# Patient Record
Sex: FEMALE | Race: WHITE | Employment: OTHER | URBAN - METROPOLITAN AREA
[De-identification: names, ages, dates, MRNs, and addresses within clinical notes are randomized per-mention and may not be internally consistent; named-entity substitution may affect disease eponyms.]

---

## 2022-01-04 ENCOUNTER — APPOINTMENT (OUTPATIENT)
Dept: GENERAL RADIOLOGY | Age: 67
DRG: 190 | End: 2022-01-04
Payer: COMMERCIAL

## 2022-01-04 ENCOUNTER — HOSPITAL ENCOUNTER (INPATIENT)
Age: 67
LOS: 4 days | Discharge: PSYCHIATRIC OR SUBSTANCE USE FACILITY | DRG: 190 | End: 2022-01-09
Attending: STUDENT IN AN ORGANIZED HEALTH CARE EDUCATION/TRAINING PROGRAM | Admitting: INTERNAL MEDICINE
Payer: COMMERCIAL

## 2022-01-04 DIAGNOSIS — J18.9 HCAP (HEALTHCARE-ASSOCIATED PNEUMONIA): Primary | ICD-10-CM

## 2022-01-04 LAB
ANION GAP SERPL CALCULATED.3IONS-SCNC: 10 MMOL/L (ref 3–16)
BASE EXCESS VENOUS: 10.2 MMOL/L (ref -2–3)
BILIRUBIN URINE: NEGATIVE
BLOOD, URINE: NEGATIVE
BUN BLDV-MCNC: 19 MG/DL (ref 7–20)
CALCIUM SERPL-MCNC: 9.2 MG/DL (ref 8.3–10.6)
CARBOXYHEMOGLOBIN: 0.9 % (ref 0–1.5)
CHLORIDE BLD-SCNC: 99 MMOL/L (ref 99–110)
CLARITY: CLEAR
CO2: 34 MMOL/L (ref 21–32)
COLOR: YELLOW
CREAT SERPL-MCNC: <0.5 MG/DL (ref 0.6–1.2)
GFR AFRICAN AMERICAN: >60
GFR NON-AFRICAN AMERICAN: >60
GLUCOSE BLD-MCNC: 102 MG/DL (ref 70–99)
GLUCOSE URINE: NEGATIVE MG/DL
HCO3 VENOUS: 38.7 MMOL/L (ref 24–28)
HEMOGLOBIN, VEN, REDUCED: 14.9 %
KETONES, URINE: NEGATIVE MG/DL
LACTIC ACID: 1.4 MMOL/L (ref 0.4–2)
LEUKOCYTE ESTERASE, URINE: NEGATIVE
METHEMOGLOBIN VENOUS: 0.5 % (ref 0–1.5)
MICROSCOPIC EXAMINATION: NORMAL
NITRITE, URINE: NEGATIVE
O2 SAT, VEN: 85 %
PCO2, VEN: 67.2 MMHG (ref 41–51)
PH UA: 7 (ref 5–8)
PH VENOUS: 7.37 (ref 7.35–7.45)
PO2, VEN: 52.6 MMHG (ref 25–40)
POTASSIUM REFLEX MAGNESIUM: 4.1 MMOL/L (ref 3.5–5.1)
PRO-BNP: 132 PG/ML (ref 0–124)
PROTEIN UA: NEGATIVE MG/DL
SODIUM BLD-SCNC: 143 MMOL/L (ref 136–145)
SPECIFIC GRAVITY UA: 1.02 (ref 1–1.03)
TCO2 CALC VENOUS: 41 MMOL/L
TROPONIN: <0.01 NG/ML
URINE REFLEX TO CULTURE: NORMAL
URINE TYPE: NORMAL
UROBILINOGEN, URINE: 0.2 E.U./DL

## 2022-01-04 PROCEDURE — 84145 PROCALCITONIN (PCT): CPT

## 2022-01-04 PROCEDURE — 71045 X-RAY EXAM CHEST 1 VIEW: CPT

## 2022-01-04 PROCEDURE — 84484 ASSAY OF TROPONIN QUANT: CPT

## 2022-01-04 PROCEDURE — 83605 ASSAY OF LACTIC ACID: CPT

## 2022-01-04 PROCEDURE — 82803 BLOOD GASES ANY COMBINATION: CPT

## 2022-01-04 PROCEDURE — 83880 ASSAY OF NATRIURETIC PEPTIDE: CPT

## 2022-01-04 PROCEDURE — 99285 EMERGENCY DEPT VISIT HI MDM: CPT

## 2022-01-04 PROCEDURE — 80048 BASIC METABOLIC PNL TOTAL CA: CPT

## 2022-01-04 PROCEDURE — 87804 INFLUENZA ASSAY W/OPTIC: CPT

## 2022-01-04 PROCEDURE — U0005 INFEC AGEN DETEC AMPLI PROBE: HCPCS

## 2022-01-04 PROCEDURE — 36415 COLL VENOUS BLD VENIPUNCTURE: CPT

## 2022-01-04 PROCEDURE — U0003 INFECTIOUS AGENT DETECTION BY NUCLEIC ACID (DNA OR RNA); SEVERE ACUTE RESPIRATORY SYNDROME CORONAVIRUS 2 (SARS-COV-2) (CORONAVIRUS DISEASE [COVID-19]), AMPLIFIED PROBE TECHNIQUE, MAKING USE OF HIGH THROUGHPUT TECHNOLOGIES AS DESCRIBED BY CMS-2020-01-R: HCPCS

## 2022-01-04 PROCEDURE — 85025 COMPLETE CBC W/AUTO DIFF WBC: CPT

## 2022-01-04 PROCEDURE — 81003 URINALYSIS AUTO W/O SCOPE: CPT

## 2022-01-04 PROCEDURE — 87040 BLOOD CULTURE FOR BACTERIA: CPT

## 2022-01-04 PROCEDURE — 93005 ELECTROCARDIOGRAM TRACING: CPT | Performed by: INTERNAL MEDICINE

## 2022-01-05 ENCOUNTER — APPOINTMENT (OUTPATIENT)
Dept: CT IMAGING | Age: 67
DRG: 190 | End: 2022-01-05
Payer: COMMERCIAL
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
## 2022-01-05 PROBLEM — J18.9 HAP (HOSPITAL-ACQUIRED PNEUMONIA): Status: ACTIVE | Noted: 2022-01-05

## 2022-01-05 LAB
ATYPICAL LYMPHOCYTE RELATIVE PERCENT: 4 % (ref 0–6)
BANDED NEUTROPHILS RELATIVE PERCENT: 1 % (ref 0–7)
BASOPHILS ABSOLUTE: 0 K/UL (ref 0–0.2)
BASOPHILS RELATIVE PERCENT: 0 %
BLASTS RELATIVE PERCENT: 1 %
EKG ATRIAL RATE: 108 BPM
EKG DIAGNOSIS: NORMAL
EKG P AXIS: 49 DEGREES
EKG P-R INTERVAL: 92 MS
EKG Q-T INTERVAL: 334 MS
EKG QRS DURATION: 68 MS
EKG QTC CALCULATION (BAZETT): 447 MS
EKG R AXIS: 108 DEGREES
EKG T AXIS: 56 DEGREES
EKG VENTRICULAR RATE: 108 BPM
EOSINOPHILS ABSOLUTE: 0.5 K/UL (ref 0–0.6)
EOSINOPHILS RELATIVE PERCENT: 4 %
HCT VFR BLD CALC: 47.7 % (ref 36–48)
HEMATOLOGY PATH CONSULT: NORMAL
HEMATOLOGY PATH CONSULT: YES
HEMOGLOBIN: 15.7 G/DL (ref 12–16)
LYMPHOCYTES ABSOLUTE: 1.2 K/UL (ref 1–5.1)
LYMPHOCYTES RELATIVE PERCENT: 6 %
MCH RBC QN AUTO: 32.7 PG (ref 26–34)
MCHC RBC AUTO-ENTMCNC: 32.9 G/DL (ref 31–36)
MCV RBC AUTO: 99.4 FL (ref 80–100)
MONOCYTES ABSOLUTE: 1.7 K/UL (ref 0–1.3)
MONOCYTES RELATIVE PERCENT: 15 %
NEUTROPHILS ABSOLUTE: 8 K/UL (ref 1.7–7.7)
NEUTROPHILS RELATIVE PERCENT: 68 %
PDW BLD-RTO: 13.4 % (ref 12.4–15.4)
PLASMA CELLS PERCENT: 1 %
PLATELET # BLD: 196 K/UL (ref 135–450)
PLATELET SLIDE REVIEW: ADEQUATE
PMV BLD AUTO: 9.4 FL (ref 5–10.5)
PROCALCITONIN: 0.04 NG/ML (ref 0–0.15)
RAPID INFLUENZA  B AGN: NEGATIVE
RAPID INFLUENZA A AGN: NEGATIVE
RBC # BLD: 4.8 M/UL (ref 4–5.2)
RBC # BLD: NORMAL 10*6/UL
SARS-COV-2: NOT DETECTED
WBC # BLD: 11.6 K/UL (ref 4–11)

## 2022-01-05 PROCEDURE — 2580000003 HC RX 258: Performed by: EMERGENCY MEDICINE

## 2022-01-05 PROCEDURE — 94640 AIRWAY INHALATION TREATMENT: CPT

## 2022-01-05 PROCEDURE — 6360000004 HC RX CONTRAST MEDICATION: Performed by: EMERGENCY MEDICINE

## 2022-01-05 PROCEDURE — 2580000003 HC RX 258: Performed by: INTERNAL MEDICINE

## 2022-01-05 PROCEDURE — 6370000000 HC RX 637 (ALT 250 FOR IP): Performed by: INTERNAL MEDICINE

## 2022-01-05 PROCEDURE — 2060000000 HC ICU INTERMEDIATE R&B

## 2022-01-05 PROCEDURE — 6360000002 HC RX W HCPCS: Performed by: INTERNAL MEDICINE

## 2022-01-05 PROCEDURE — 96372 THER/PROPH/DIAG INJ SC/IM: CPT

## 2022-01-05 PROCEDURE — 6360000002 HC RX W HCPCS: Performed by: EMERGENCY MEDICINE

## 2022-01-05 PROCEDURE — 71260 CT THORAX DX C+: CPT

## 2022-01-05 PROCEDURE — 2700000000 HC OXYGEN THERAPY PER DAY

## 2022-01-05 PROCEDURE — 94761 N-INVAS EAR/PLS OXIMETRY MLT: CPT

## 2022-01-05 PROCEDURE — 87040 BLOOD CULTURE FOR BACTERIA: CPT

## 2022-01-05 RX ORDER — DIVALPROEX SODIUM 250 MG/1
250 TABLET, DELAYED RELEASE ORAL 2 TIMES DAILY
Status: DISCONTINUED | OUTPATIENT
Start: 2022-01-05 | End: 2022-01-09 | Stop reason: HOSPADM

## 2022-01-05 RX ORDER — MAGNESIUM HYDROXIDE/ALUMINUM HYDROXICE/SIMETHICONE 120; 1200; 1200 MG/30ML; MG/30ML; MG/30ML
30 SUSPENSION ORAL EVERY 4 HOURS PRN
COMMUNITY

## 2022-01-05 RX ORDER — NICOTINE 21 MG/24HR
1 PATCH, TRANSDERMAL 24 HOURS TRANSDERMAL EVERY 24 HOURS
COMMUNITY

## 2022-01-05 RX ORDER — DIVALPROEX SODIUM 250 MG/1
250 TABLET, DELAYED RELEASE ORAL 2 TIMES DAILY
COMMUNITY

## 2022-01-05 RX ORDER — SODIUM CHLORIDE 0.9 % (FLUSH) 0.9 %
5-40 SYRINGE (ML) INJECTION EVERY 12 HOURS SCHEDULED
Status: DISCONTINUED | OUTPATIENT
Start: 2022-01-05 | End: 2022-01-09 | Stop reason: HOSPADM

## 2022-01-05 RX ORDER — ATORVASTATIN CALCIUM 20 MG/1
20 TABLET, FILM COATED ORAL NIGHTLY
Status: DISCONTINUED | OUTPATIENT
Start: 2022-01-05 | End: 2022-01-09 | Stop reason: HOSPADM

## 2022-01-05 RX ORDER — DONEPEZIL HYDROCHLORIDE 5 MG/1
5 TABLET, FILM COATED ORAL NIGHTLY
COMMUNITY

## 2022-01-05 RX ORDER — MONTELUKAST SODIUM 10 MG/1
10 TABLET ORAL NIGHTLY
Status: DISCONTINUED | OUTPATIENT
Start: 2022-01-05 | End: 2022-01-09 | Stop reason: HOSPADM

## 2022-01-05 RX ORDER — LEVETIRACETAM 750 MG/1
1500 TABLET ORAL 2 TIMES DAILY
Status: DISCONTINUED | OUTPATIENT
Start: 2022-01-05 | End: 2022-01-09 | Stop reason: HOSPADM

## 2022-01-05 RX ORDER — LEVETIRACETAM 500 MG/1
1500 TABLET ORAL 3 TIMES DAILY
COMMUNITY
End: 2022-01-05 | Stop reason: ALTCHOICE

## 2022-01-05 RX ORDER — HALOPERIDOL 5 MG/ML
5 INJECTION INTRAMUSCULAR ONCE
Status: COMPLETED | OUTPATIENT
Start: 2022-01-05 | End: 2022-01-05

## 2022-01-05 RX ORDER — MONTELUKAST SODIUM 10 MG/1
10 TABLET ORAL NIGHTLY
COMMUNITY

## 2022-01-05 RX ORDER — ACETAMINOPHEN 325 MG/1
650 TABLET ORAL EVERY 6 HOURS PRN
Status: DISCONTINUED | OUTPATIENT
Start: 2022-01-05 | End: 2022-01-09 | Stop reason: HOSPADM

## 2022-01-05 RX ORDER — IPRATROPIUM BROMIDE AND ALBUTEROL SULFATE 2.5; .5 MG/3ML; MG/3ML
1 SOLUTION RESPIRATORY (INHALATION) EVERY 6 HOURS PRN
COMMUNITY

## 2022-01-05 RX ORDER — LEVOFLOXACIN 5 MG/ML
500 INJECTION, SOLUTION INTRAVENOUS EVERY 24 HOURS
Status: DISCONTINUED | OUTPATIENT
Start: 2022-01-05 | End: 2022-01-09 | Stop reason: HOSPADM

## 2022-01-05 RX ORDER — LORAZEPAM 1 MG/1
1 TABLET ORAL 2 TIMES DAILY PRN
Status: DISCONTINUED | OUTPATIENT
Start: 2022-01-05 | End: 2022-01-06

## 2022-01-05 RX ORDER — ALBUTEROL SULFATE 90 UG/1
2 AEROSOL, METERED RESPIRATORY (INHALATION) EVERY 6 HOURS PRN
COMMUNITY

## 2022-01-05 RX ORDER — SUCRALFATE 1 G/1
1 TABLET ORAL 4 TIMES DAILY
COMMUNITY

## 2022-01-05 RX ORDER — ACETAMINOPHEN 325 MG/1
650 TABLET ORAL EVERY 6 HOURS PRN
COMMUNITY

## 2022-01-05 RX ORDER — LEVETIRACETAM 500 MG/1
1500 TABLET ORAL 2 TIMES DAILY
COMMUNITY

## 2022-01-05 RX ORDER — ONDANSETRON 4 MG/1
4 TABLET, ORALLY DISINTEGRATING ORAL EVERY 8 HOURS PRN
Status: DISCONTINUED | OUTPATIENT
Start: 2022-01-05 | End: 2022-01-09 | Stop reason: HOSPADM

## 2022-01-05 RX ORDER — DONEPEZIL HYDROCHLORIDE 5 MG/1
5 TABLET, FILM COATED ORAL NIGHTLY
Status: DISCONTINUED | OUTPATIENT
Start: 2022-01-05 | End: 2022-01-07

## 2022-01-05 RX ORDER — SODIUM CHLORIDE 9 MG/ML
25 INJECTION, SOLUTION INTRAVENOUS PRN
Status: DISCONTINUED | OUTPATIENT
Start: 2022-01-05 | End: 2022-01-09 | Stop reason: HOSPADM

## 2022-01-05 RX ORDER — TRAZODONE HYDROCHLORIDE 50 MG/1
25 TABLET ORAL NIGHTLY
COMMUNITY
End: 2022-01-05 | Stop reason: ALTCHOICE

## 2022-01-05 RX ORDER — MORPHINE SULFATE 2 MG/ML
2 INJECTION, SOLUTION INTRAMUSCULAR; INTRAVENOUS ONCE
Status: COMPLETED | OUTPATIENT
Start: 2022-01-05 | End: 2022-01-05

## 2022-01-05 RX ORDER — LEVETIRACETAM 750 MG/1
1500 TABLET ORAL 3 TIMES DAILY
Status: DISCONTINUED | OUTPATIENT
Start: 2022-01-05 | End: 2022-01-05 | Stop reason: CLARIF

## 2022-01-05 RX ORDER — ONDANSETRON 2 MG/ML
4 INJECTION INTRAMUSCULAR; INTRAVENOUS EVERY 6 HOURS PRN
Status: DISCONTINUED | OUTPATIENT
Start: 2022-01-05 | End: 2022-01-09 | Stop reason: HOSPADM

## 2022-01-05 RX ORDER — ATORVASTATIN CALCIUM 20 MG/1
20 TABLET, FILM COATED ORAL NIGHTLY
COMMUNITY

## 2022-01-05 RX ORDER — POLYETHYLENE GLYCOL 3350 17 G/17G
17 POWDER, FOR SOLUTION ORAL DAILY PRN
Status: DISCONTINUED | OUTPATIENT
Start: 2022-01-05 | End: 2022-01-09 | Stop reason: HOSPADM

## 2022-01-05 RX ORDER — OMEPRAZOLE 10 MG/1
10 CAPSULE, DELAYED RELEASE ORAL DAILY
COMMUNITY
End: 2022-01-05 | Stop reason: ALTCHOICE

## 2022-01-05 RX ORDER — ACETAMINOPHEN 650 MG/1
650 SUPPOSITORY RECTAL EVERY 6 HOURS PRN
Status: DISCONTINUED | OUTPATIENT
Start: 2022-01-05 | End: 2022-01-09 | Stop reason: HOSPADM

## 2022-01-05 RX ORDER — OMEPRAZOLE 40 MG/1
40 CAPSULE, DELAYED RELEASE ORAL DAILY
COMMUNITY

## 2022-01-05 RX ORDER — SODIUM CHLORIDE 0.9 % (FLUSH) 0.9 %
5-40 SYRINGE (ML) INJECTION PRN
Status: DISCONTINUED | OUTPATIENT
Start: 2022-01-05 | End: 2022-01-09 | Stop reason: HOSPADM

## 2022-01-05 RX ADMIN — CEFEPIME HYDROCHLORIDE 2000 MG: 2 INJECTION, POWDER, FOR SOLUTION INTRAVENOUS at 08:49

## 2022-01-05 RX ADMIN — LEVETIRACETAM 1500 MG: 750 TABLET ORAL at 09:34

## 2022-01-05 RX ADMIN — LEVOFLOXACIN 500 MG: 5 INJECTION, SOLUTION INTRAVENOUS at 16:03

## 2022-01-05 RX ADMIN — CEFEPIME HYDROCHLORIDE 1000 MG: 1 INJECTION, POWDER, FOR SOLUTION INTRAMUSCULAR; INTRAVENOUS at 03:37

## 2022-01-05 RX ADMIN — IOPAMIDOL 80 ML: 755 INJECTION, SOLUTION INTRAVENOUS at 00:46

## 2022-01-05 RX ADMIN — SODIUM CHLORIDE 25 ML: 9 INJECTION, SOLUTION INTRAVENOUS at 16:02

## 2022-01-05 RX ADMIN — SODIUM CHLORIDE 25 ML: 9 INJECTION, SOLUTION INTRAVENOUS at 08:48

## 2022-01-05 RX ADMIN — VANCOMYCIN HYDROCHLORIDE 1250 MG: 10 INJECTION, POWDER, LYOPHILIZED, FOR SOLUTION INTRAVENOUS at 04:37

## 2022-01-05 RX ADMIN — LEVETIRACETAM 1500 MG: 750 TABLET, FILM COATED ORAL at 21:02

## 2022-01-05 RX ADMIN — IPRATROPIUM BROMIDE AND ALBUTEROL 1 PUFF: 20; 100 SPRAY, METERED RESPIRATORY (INHALATION) at 09:56

## 2022-01-05 RX ADMIN — HALOPERIDOL LACTATE 5 MG: 5 INJECTION, SOLUTION INTRAMUSCULAR at 00:23

## 2022-01-05 RX ADMIN — ATORVASTATIN CALCIUM 20 MG: 20 TABLET, FILM COATED ORAL at 21:02

## 2022-01-05 RX ADMIN — IPRATROPIUM BROMIDE AND ALBUTEROL 1 PUFF: 20; 100 SPRAY, METERED RESPIRATORY (INHALATION) at 16:00

## 2022-01-05 RX ADMIN — DIVALPROEX SODIUM 250 MG: 250 TABLET, DELAYED RELEASE ORAL at 09:33

## 2022-01-05 RX ADMIN — IPRATROPIUM BROMIDE AND ALBUTEROL 1 PUFF: 20; 100 SPRAY, METERED RESPIRATORY (INHALATION) at 12:00

## 2022-01-05 RX ADMIN — MORPHINE SULFATE 2 MG: 2 INJECTION, SOLUTION INTRAMUSCULAR; INTRAVENOUS at 14:18

## 2022-01-05 RX ADMIN — SODIUM CHLORIDE, PRESERVATIVE FREE 10 ML: 5 INJECTION INTRAVENOUS at 21:04

## 2022-01-05 RX ADMIN — DONEPEZIL HYDROCHLORIDE 5 MG: 5 TABLET, FILM COATED ORAL at 21:02

## 2022-01-05 RX ADMIN — LORAZEPAM 1 MG: 1 TABLET ORAL at 18:27

## 2022-01-05 RX ADMIN — MONTELUKAST 10 MG: 10 TABLET, FILM COATED ORAL at 21:02

## 2022-01-05 RX ADMIN — SODIUM CHLORIDE, PRESERVATIVE FREE 10 ML: 5 INJECTION INTRAVENOUS at 16:03

## 2022-01-05 RX ADMIN — DIVALPROEX SODIUM 250 MG: 250 TABLET, DELAYED RELEASE ORAL at 21:02

## 2022-01-05 RX ADMIN — ENOXAPARIN SODIUM 40 MG: 100 INJECTION SUBCUTANEOUS at 11:02

## 2022-01-05 ASSESSMENT — PAIN SCALES - GENERAL
PAINLEVEL_OUTOF10: 0
PAINLEVEL_OUTOF10: 7
PAINLEVEL_OUTOF10: 0
PAINLEVEL_OUTOF10: 10

## 2022-01-05 ASSESSMENT — ENCOUNTER SYMPTOMS
EYES NEGATIVE: 1
ABDOMINAL PAIN: 0
VOMITING: 1
SHORTNESS OF BREATH: 1
NAUSEA: 1
WHEEZING: 1
ALLERGIC/IMMUNOLOGIC NEGATIVE: 1
COUGH: 1
BACK PAIN: 1

## 2022-01-05 ASSESSMENT — PAIN - FUNCTIONAL ASSESSMENT: PAIN_FUNCTIONAL_ASSESSMENT: PREVENTS OR INTERFERES SOME ACTIVE ACTIVITIES AND ADLS

## 2022-01-05 ASSESSMENT — PAIN DESCRIPTION - LOCATION: LOCATION: BACK;HEAD

## 2022-01-05 ASSESSMENT — PAIN DESCRIPTION - DESCRIPTORS: DESCRIPTORS: ACHING;DISCOMFORT;HEADACHE

## 2022-01-05 ASSESSMENT — PAIN DESCRIPTION - PROGRESSION: CLINICAL_PROGRESSION: NOT CHANGED

## 2022-01-05 ASSESSMENT — PAIN DESCRIPTION - ONSET: ONSET: ON-GOING

## 2022-01-05 ASSESSMENT — PAIN DESCRIPTION - FREQUENCY: FREQUENCY: CONTINUOUS

## 2022-01-05 ASSESSMENT — PAIN DESCRIPTION - ORIENTATION: ORIENTATION: LOWER

## 2022-01-05 ASSESSMENT — PAIN DESCRIPTION - PAIN TYPE: TYPE: ACUTE PAIN

## 2022-01-05 NOTE — CONSULTS
Clinical Pharmacy Progress Note    Vancomycin - Management by Pharmacy    Consult Date(s): 1/5  Consulting Provider(s): Dr Pam Oneil / Plan    HAP - Vancomycin   Concurrent Antimicrobials: Cefepime  o Cefepime -- day #1 -- will adjust dose per policy to 8128FC IV F2K based on indication / renal function.  Day of Vanc Therapy: #1   Current Dosing Method: Bayesian-Guided AUC Dosing   Therapeutic Goal: 400-600 mg/L*hr   Current Dose / Frequency: 750mg IV q12h   Plan / Rationale:   o Patient loaded with 1250mg IV x1 in the ED.   o Scr today < 0.5; likely at baseline. o Regimen of 750mg IV q12h predicts an AUC of 438 and trough 12.9 mcg/mL. o Will plan for a trough level 1/6 @ 0330 to assess kinetics. o MRSA nasal PCR ordered.  Will continue to monitor clinical condition and make adjustments to regimen as appropriate. Thank you for consulting Pharmacy! Flynn Burkitt PharmD., BCPS   1/5/2022 8:21 AM  Wireless: [phone]      Subjective/Objective: Ms. Elder Abebe is a 74 y.o. female from [facility] with a PMHx significant for HTN, COPD on chronic O2, seizure dx, vascular dementia, major depressive dx, and chronic sinusitis. [facility] despite new finding of [address] after a fall. Presented back to [facility] on 12/27 after being \"lost for 2 days,\" then admitted 12/29-1/1 with active psychosis, and discharged to [facility]. Presents now from [facility] with subjective fever, chills, and SOB. CTPA in the ED negative for PE, but shows evidence of PNA. Pharmacy has been consulted to dose vancomycin.     Height:   Ht Readings from Last 1 Encounters:   01/04/22 5' 4\" (1.626 m)     Weight:   Wt Readings from Last 1 Encounters:   01/04/22 110 lb 14.4 oz (50.3 kg)       Current & Prior Antimicrobial Regimen(s):   Cefepime 2000mg IV q8h (1/5-current)   Vancomycin -- pharmacy to dose   o 1250mg IV x1 (12/5)  o 750mg IV q12h (12/5-current)    Level(s) / Doses:    Date Time Dose Level / Type of Level Interpretation   1/6 0330 750mg IV q12h Trough = ordered           Note: Serum levels collected for AUC-based dosing may be high if collected in close proximity to the dose administered. This is not necessarily an indicator of toxicity. Cultures & Sensitivities:    Date Site Micro Susceptibility / Result   1/4 COVID In process    1/4 Rapid flu Negative    1/5 Blood Collected    1/5 MSRA nasal PCR Ordered      Labs / Ancillary Data:    CrCl cannot be calculated (This lab value cannot be used to calculate CrCl because it is not a number: <0.5).     Recent Labs     01/04/22  2210   CREATININE <0.5*   BUN 19   WBC 11.6*       Additional Lab Values / Findings of Note:    Procalcitonin:   Recent Labs     01/04/22  2210   PROCAL 0.04

## 2022-01-05 NOTE — ED PROVIDER NOTES
[address]          ATTENDING PHYSICIAN NOTE       Date of evaluation: 1/4/2022    Chief Complaint     Other (oxygen tubing got caught on chair, on 4 liters nc continously- ( normally on 2 liters NC but increased yesterday)states couldn't get untangled and started feeling ill, feels back to normal now with oxygen working ( sent in by [facility])), Shortness of Breath (patient states improved), and Cough      History of Present Illness     Le Patel is a 74 y.o. female who presents with hypoxia. The patient has COPD and is on 2 L home O2. She states that the oxygen tubing got tangled causing her to feel short of breath but that she now feels better. Her facility states that she has had subjective fever and chills for the last couple of days, lethargy, and they had to increase her oxygen to 4 L. The patient has a cough while I am taking a history and states that has been there for couple of days, occasionally productive of phlegm. She reports feeling short of breath and having chest pain although cannot state whether this is worse than usual with her COPD. Does admit to having fevers and chills. Does admit to feeling generally weak for the last couple of days and tiring out more easily. No vomiting or diarrhea. No abdominal pain. No other aggravating relieving factors or associated symptoms. Patient reportedly had a rapid Covid test today that was negative, unclear whether this was antigen or PCR. Review of Systems     Positive for subjective fever, shortness of breath, chest pain. Negative for vomiting and abdominal pain. All other systems were reviewed and are negative, except as mentioned in HPI. Past Medical, Surgical, Family, and Social History     She has no past medical history on file. She has no past surgical history on file. Her family history is not on file.   She     Medications     Previous Medications    No medications on file       Allergies She has no allergies on file. Physical Exam     INITIAL VITALS: BP: 128/81, Temp: 98.6 °F (37 °C), Pulse: 115, Resp: 18, SpO2: 95 %       General:  No acute distress. Eyes:  Pupils reactive. No discharge from eyes. ENT:  No discharge from nose. Neck:  Supple. Pulmonary:   Non-labored breathing but mildly tachypneic. Breath sounds clear bilaterally. Cardiac: Tachycardic rate and regular rhythm. No murmurs. Abdomen:  Soft. Non-tender. Non-distended. Musculoskeletal:  No CVA tenderness. Skin:  No rash. Neuro: Awake and alert. Moves all four extremities to command. No focal deficit. Extremities:  Warm and perfused. No peripheral edema. Diagnostic Results     LABS:   Please see electronic medical record for laboratory tests performed in the ED. RADIOLOGY:  Please see electronic medical record for imaging studies performed in the ED. EKG   Please see medical record for specific interval measurements. Impression: Sinus tachycardia at 108 bpm, short IL interval, normal axis, T wave inversion in V3, no other acute ST changes, no old for comparison    RECENT VITALS:  BP: 128/81, Temp: 98.6 °F (37 °C), Pulse: 115, Resp: 18     Procedures         ED Course     Nursing Notes, Past Medical Hx, Past Surgical Hx, Social Hx, Allergies, and Family Hx were reviewed. The patient was given the following medications:  No orders of the defined types were placed in this encounter. CONSULTS:  None    MEDICAL DECISION MAKING / ASSESSMENT / PLAN     Valiant Charlie is a 74 y.o. female presenting with hypoxia. The patient does have an increased O2 requirement from 2 to 4 L. Is tachypneic. Describes infectious symptoms and has a notable cough. EKG unremarkable. Laboratory work-up, chest x-ray, influenza and Covid are pending. The patient will be signed out to the oncoming physician to complete work-up and disposition.                 Chelsie Noonan MD  01/04/22 0512

## 2022-01-05 NOTE — H&P
Hospital Medicine History & Physical      PCP: No primary care provider on file. Date of Admission: 1/4/2022    Date of Service: Pt seen/examined on 1/5/2022 and Admitted to Inpatient with expected LOS greater than two midnights due to medical therapy. Chief Complaint:    SOB  Feeling unwell    History Of Present Illness: This is a 75 yo female presenting w/ feeling unwell w/ sx of nausea, vomiting, myalgias and malaise x 1 week. She reports her vomiting finally stopped when she stopped eating- last meal she reports was 2 days ago which was a popsicle and soup. She Also reports a h/o copd and chronic o2 yuse but felt more sob that usual and has had a nonproductive cough. Past Medical History:    COPD  GERD  Depression    Past Surgical History:    Surgery Date Site/Laterality Comments   TONSILLECTOMY          TUBAL LIGATION          UPPER GASTROINTESTINAL ENDOSCOPY        Medications Prior to Admission:      Prior to Admission medications    Medication Sig Start Date End Date Taking? Authorizing Provider   albuterol sulfate HFA (VENTOLIN HFA) 108 (90 Base) MCG/ACT inhaler Inhale 2 puffs into the lungs every 6 hours as needed for Wheezing   Yes Historical Provider, MD   traZODone (DESYREL) 50 MG tablet Take 25 mg by mouth nightly Half of 50 mg tab   Yes Historical Provider, MD   magnesium hydroxide (MILK OF MAGNESIA) 400 MG/5ML suspension Take 30 mLs by mouth daily as needed for Constipation   Yes Historical Provider, MD   acetaminophen (TYLENOL) 325 MG tablet Take 650 mg by mouth every 6 hours as needed for Pain   Yes Historical Provider, MD   divalproex (DEPAKOTE) 250 MG DR tablet Take 250 mg by mouth 2 times daily ?  DR   Yes Historical Provider, MD   omeprazole (PRILOSEC) 10 MG delayed release capsule Take 10 mg by mouth daily   Yes Historical Provider, MD   nicotine (NICODERM CQ) 21 MG/24HR Place 1 patch onto the skin every 24 hours   Yes Historical Provider, MD   levETIRAcetam (KEPPRA) 500 MG tablet Take 1,500 mg by mouth three times daily   Yes Historical Provider, MD   sucralfate (CARAFATE) 1 GM tablet Take 1 g by mouth 4 times daily   Yes Historical Provider, MD   atorvastatin (LIPITOR) 20 MG tablet Take 20 mg by mouth nightly   Yes Historical Provider, MD   ipratropium-albuterol (DUONEB) 0.5-2.5 (3) MG/3ML SOLN nebulizer solution Inhale 1 vial into the lungs every 6 hours as needed for Shortness of Breath   Yes Historical Provider, MD   montelukast (SINGULAIR) 10 MG tablet Take 10 mg by mouth nightly   Yes Historical Provider, MD       Allergies:  Pcn [penicillins] and Sulfa antibiotics    Social History:      TOBACCO:   has no history on file for tobacco use. ETOH:   has no history on file for alcohol use. E-Cigarettes/Vaping Use     Questions Responses    E-Cigarette/Vaping Use     Start Date     Passive Exposure     Quit Date     Counseling Given     Comments         Family History:    Heart disease Brother        Cancer Mother    breast   Hypertension Mother        Seizures Paternal Uncle        Drug abuse Son 1       REVIEW OF SYSTEMS COMPLETED:    Review of Systems   Constitutional: Positive for activity change, appetite change, chills, fatigue and fever. HENT: Negative. Eyes: Negative. Respiratory: Positive for cough, shortness of breath and wheezing. Cardiovascular: Negative for chest pain, palpitations and leg swelling. Gastrointestinal: Positive for nausea and vomiting. Negative for abdominal pain. Endocrine: Negative. Genitourinary: Negative. Musculoskeletal: Positive for back pain and myalgias. Allergic/Immunologic: Negative. Neurological: Negative. Hematological: Negative. Psychiatric/Behavioral: The patient is nervous/anxious.       PHYSICAL EXAM PERFORMED:    /72   Pulse 92   Temp 98.6 °F (37 °C) (Axillary)   Resp (!) 0   Ht 5' 4\" (1.626 m)   Wt 110 lb 14.4 oz (50.3 kg)   SpO2 94%   Breastfeeding No   BMI 19.04 kg/m²     General appearance: appears ill  HEENT:    Extra ocular muscles intact. Conjunctivae/corneas clear. Neck: Supple, with full range of motion. Respiratory:  Normal respiratory effort. Course bs b/l  Cardiovascular:  Tachy  Abdomen: Soft, non-tender, non-distended with normal bowel sounds. Musculoskeletal:  No clubbing, cyanosis or edema bilaterally. Skin: Skin color, texture, turgor normal.  No rashes or lesions. Neurologic:  Neurovascularly intact without any focal sensory/motor deficits. Cranial nerves: II-XII intact, grossly non-focal.  Psychiatric:  Alert and oriented, thought content appropriate, normal insight    Labs:     Recent Labs     01/04/22 2210   WBC 11.6*   HGB 15.7   HCT 47.7        Recent Labs     01/04/22 2210      K 4.1   CL 99   CO2 34*   BUN 19   CREATININE <0.5*   CALCIUM 9.2     No results for input(s): AST, ALT, BILIDIR, BILITOT, ALKPHOS in the last 72 hours. No results for input(s): INR in the last 72 hours. Recent Labs     01/04/22 2210   TROPONINI <0.01       Urinalysis:      Lab Results   Component Value Date    NITRU Negative 01/04/2022    BLOODU Negative 01/04/2022    SPECGRAV 1.020 01/04/2022    GLUCOSEU Negative 01/04/2022       Radiology:     CT CHEST PULMONARY EMBOLISM W CONTRAST   Final Result      1. No evidence of pulmonary embolus. 2. Moderate left basilar airspace consolidation consistent with pneumonia. 3. Severe emphysema. XR CHEST PORTABLE   Final Result   1. Emphysema. 2. No acute airspace disease. ASSESSMENT/ PLAN:    Acute COPD exacerbation:  Likely 2/2 acute tracheobronchitis vs viral.  Covid pending. Started on cefepime and vanc as comes from a facility but can likely de-escalate as cxr negative. Will place on levaquin awaiting covid. If covid negative will obtain viral panel to exclude rhinovirus/ rsv etc.  Started on decadron- cont    Seizure d/o:  Cont keppra. Anxiety:  Add prn ativan.     GERD:  Place on ppi and

## 2022-01-05 NOTE — PROGRESS NOTES
Vancomcyin has been discontinued. Pharmacy will sign off consult. If medication dosing is resumed, please re-consult pharmacy.     Kat LearyD, [facility] 1/5/2022 3:43 PM

## 2022-01-05 NOTE — ED PROVIDER NOTES
[address] ENCOUNTER          ATTENDING PHYSICIAN NOTE       Date of evaluation: 1/4/2022    ADDENDUM:      Care of this patient was assumed from Dr. Clifton Rodríguez. The patient was seen for Other (oxygen tubing got caught on chair, on 4 liters nc continously- ( normally on 2 liters NC but increased yesterday)states couldn't get untangled and started feeling ill, feels back to normal now with oxygen working ( sent in by [facility])), Shortness of Breath (patient states improved), and Cough  . The patient's initial evaluation and plan have been discussed with the prior provider who initially evaluated the patient. Nursing Notes, Past Medical Hx, Past Surgical Hx, Social Hx, Allergies, and Family Hx were all reviewed. Patient is a 78-year-old female with history of COPD on 2 L nasal cannula oxygen at baseline who presents from [facility] care facility for shortness of breath. According to staff at the facility, patient's had subjective fevers and chills for the last couple of days and they have had to increase her baseline oxygen. Patient was also noted to have a cough that is productive of sputum. Patient states that she was short of breath but believes that her oxygen tubing was tangled and since it was untangled she states that her breathing feels better. Patient reportedly had a negative rapid Covid test at her facility today. On arrival, patient was noted to be tachycardic but otherwise hemodynamically stable. She has clear breath sounds with no obvious wheezing. EKG shows sinus tachycardic rhythm with no acute ischemic abnormalities other than isolated T wave inversion in lead V3. At time of turnover, chest x-ray and laboratory studies were pending. Diagnostic Results     EKG   EKG shows sinus tachycardia with no acute ischemic abnormalities. RADIOLOGY:  CT CHEST PULMONARY EMBOLISM W CONTRAST   Final Result      1. No evidence of pulmonary embolus. 2. Moderate left basilar airspace consolidation consistent with pneumonia. 3. Severe emphysema. XR CHEST PORTABLE   Final Result   1. Emphysema. 2. No acute airspace disease.           LABS:   Results for orders placed or performed during the hospital encounter of 01/04/22   Rapid influenza A/B antigens    Specimen: Nasopharyngeal   Result Value Ref Range    Rapid Influenza A Ag Negative Negative    Rapid Influenza B Ag Negative Negative   CBC Auto Differential   Result Value Ref Range    WBC 11.6 (H) 4.0 - 11.0 K/uL    RBC 4.80 4.00 - 5.20 M/uL    Hemoglobin 15.7 12.0 - 16.0 g/dL    Hematocrit 47.7 36.0 - 48.0 %    MCV 99.4 80.0 - 100.0 fL    MCH 32.7 26.0 - 34.0 pg    MCHC 32.9 31.0 - 36.0 g/dL    RDW 13.4 12.4 - 15.4 %    Platelets 874 949 - 744 K/uL    MPV 9.4 5.0 - 10.5 fL    PLATELET SLIDE REVIEW Adequate     Path Consult Yes     Neutrophils % 68.0 %    Lymphocytes % 6.0 %    Monocytes % 15.0 %    Eosinophils % 4.0 %    Basophils % 0.0 %    Neutrophils Absolute 8.0 (H) 1.7 - 7.7 K/uL    Lymphocytes Absolute 1.2 1.0 - 5.1 K/uL    Monocytes Absolute 1.7 (H) 0.0 - 1.3 K/uL    Eosinophils Absolute 0.5 0.0 - 0.6 K/uL    Basophils Absolute 0.0 0.0 - 0.2 K/uL    Bands Relative 1 0 - 7 %    Atypical Lymphocytes Relative 4 0 - 6 %    Blasts Relative 1 (A) %    Plasma Cells Relative 1 (A) %    RBC Morphology Normal    Basic Metabolic Panel w/ Reflex to MG   Result Value Ref Range    Sodium 143 136 - 145 mmol/L    Potassium reflex Magnesium 4.1 3.5 - 5.1 mmol/L    Chloride 99 99 - 110 mmol/L    CO2 34 (H) 21 - 32 mmol/L    Anion Gap 10 3 - 16    Glucose 102 (H) 70 - 99 mg/dL    BUN 19 7 - 20 mg/dL    CREATININE <0.5 (L) 0.6 - 1.2 mg/dL    GFR Non-African American >60 >60    GFR African American >60 >60    Calcium 9.2 8.3 - 10.6 mg/dL   Troponin   Result Value Ref Range    Troponin <0.01 <0.01 ng/mL   Brain Natriuretic Peptide   Result Value Ref Range    Pro- (H) 0 - 124 pg/mL   Lactic Acid, Plasma Result Value Ref Range    Lactic Acid 1.4 0.4 - 2.0 mmol/L   Blood Gas, Venous   Result Value Ref Range    pH, Ezekiel 7.369 7.350 - 7.450    pCO2, Ezekiel 67.2 (H) 41.0 - 51.0 mmHg    pO2, Ezekiel 52.6 (H) 25.0 - 40.0 mmHg    HCO3, Venous 38.7 (H) 24.0 - 28.0 mmol/L    Base Excess, Ezekiel 10.2 (H) -2.0 - 3.0 mmol/L    O2 Sat, Ezekiel 85 Not established %    Carboxyhemoglobin 0.9 0.0 - 1.5 %    MetHgb, Ezekiel 0.5 0.0 - 1.5 %    TC02 (Calc), Ezekiel 41 mmol/L    Hemoglobin, Ezekiel, Reduced 14.90 %   Urinalysis Reflex to Culture    Specimen: Urine, clean catch   Result Value Ref Range    Color, UA Yellow Straw/Yellow    Clarity, UA Clear Clear    Glucose, Ur Negative Negative mg/dL    Bilirubin Urine Negative Negative    Ketones, Urine Negative Negative mg/dL    Specific Gravity, UA 1.020 1.005 - 1.030    Blood, Urine Negative Negative    pH, UA 7.0 5.0 - 8.0    Protein, UA Negative Negative mg/dL    Urobilinogen, Urine 0.2 <2.0 E.U./dL    Nitrite, Urine Negative Negative    Leukocyte Esterase, Urine Negative Negative    Microscopic Examination Not Indicated     Urine Type NotGiven     Urine Reflex to Culture Not Indicated        RECENT VITALS:  BP: 110/87, Temp: 98.6 °F (37 °C), Pulse: 105, Resp: 23, SpO2: 91 %     Procedures     N/A    ED Course     The patient was given the following medications:  Orders Placed This Encounter   Medications    haloperidol lactate (HALDOL) injection 5 mg    iopamidol (ISOVUE-370) 76 % injection 80 mL    cefepime (MAXIPIME) 1000 mg IVPB minibag     Order Specific Question:   Antimicrobial Indications     Answer:   Pneumonia (HAP)       CONSULTS:  PHARMACY TO DOSE VANCOMYCIN  IP CONSULT TO HOSPITALIST    MEDICAL DECISION MAKING / ASSESSMENT / Shruthi Reynosofroy is a 74 y.o. female who is currently inpatient at [facility] for psychiatric care presents for worsening hypoxia.   Patient does have a history of COPD and is on 2 L oxygen dependent at baseline but per staff at the care facility they have had to increase her oxygen to 4 L over the last several days. On arrival, patient states she has no complaints. She was noted to be tachycardic anywhere between 105-120 with oxygen saturations in the high 80s on her baseline 2 L and come up into the low 90s on 3 L in mid to upper 90s on 4 L. She does have clear breath sounds bilaterally. EKG shows sinus tachycardia with no acute ischemic abnormalities. Laboratory studies are significant for white blood cell count of 11.6 with a left shift. She also has atypical lymphocytes present. Rapid flu swab was negative. Rapid Covid swab was performed at the facility which was negative and confirmatory Covid PCR was sent from the emergency department and is pending. Chest x-ray shows no acute airspace disease with findings consistent of emphysema. Because the patient's persistent tachycardia, CTPA was obtained which shows no evidence of pulmonary embolism. It does show evidence of airspace disease in the left base consistent with pneumonia. My concern is for a bacterial pneumonia so the patient was started on IV antibiotics. I did discuss the case with the hospitalist and the patient will be admitted for further management. Clinical Impression     1. HCAP (healthcare-associated pneumonia)        Disposition     PATIENT REFERRED TO:  No follow-up provider specified.     DISCHARGE MEDICATIONS:  New Prescriptions    No medications on file       DISPOSITION          Claudette Greenberg MD  01/05/22 7512

## 2022-01-05 NOTE — PROGRESS NOTES
Clinical Pharmacy Progress Note  Medication History     Admit Date: 1/4/2022    List of of current medications patient is taking is complete. Home Medication list in EPIC updated to reflect changes noted below. Source of information: [facility] copy    Changes made to medication list:   Medications removed:    Trazodone 25mg QHS -- DC'ed per MAR on 1/4  Medications added:    Donepezil   Maalox prn  Medication doses adjusted:    Omeprazole -- changed from 10mg to 40mg daily   Keppra -- changed from TID to BID  Other notes:   · PS sent to Dr Lily Cuenca to notify of the above. Please call with any questions.   Sonja StephensD., BCPS   1/5/2022 9:43 AM  Wireless: [phone]

## 2022-01-05 NOTE — ED NOTES
Bed: B26-26  Expected date:   Expected time:   Means of arrival:   Comments:  [address] [facility]  01/04/22 2053

## 2022-01-05 NOTE — CONSULTS
Clinical Pharmacy Progress Note        Subjective/Objective:  Pharmacy is consulted to dose Vancomycin x1 dose in ED per Dr. Yuriy Wilson     Ht = 64\"  Wt = 50. 3        Assessment/Plan:  · Will order Vancomycin 1250 mg IV x1 for administration in ED per ER pharmacy consult.    · If Vancomycin is to continue on admission and pharmacy is to manage dosing, please re-consult with admission orders.        Thanks--  Alcira Blount RP 1/5/2022, 2:58 AM

## 2022-01-06 LAB
ANION GAP SERPL CALCULATED.3IONS-SCNC: 11 MMOL/L (ref 3–16)
BASOPHILS ABSOLUTE: 0.1 K/UL (ref 0–0.2)
BASOPHILS RELATIVE PERCENT: 0.5 %
BUN BLDV-MCNC: 23 MG/DL (ref 7–20)
CALCIUM SERPL-MCNC: 8.7 MG/DL (ref 8.3–10.6)
CHLORIDE BLD-SCNC: 99 MMOL/L (ref 99–110)
CO2: 31 MMOL/L (ref 21–32)
CREAT SERPL-MCNC: <0.5 MG/DL (ref 0.6–1.2)
EOSINOPHILS ABSOLUTE: 0.1 K/UL (ref 0–0.6)
EOSINOPHILS RELATIVE PERCENT: 0.9 %
GFR AFRICAN AMERICAN: >60
GFR NON-AFRICAN AMERICAN: >60
GLUCOSE BLD-MCNC: 74 MG/DL (ref 70–99)
HCT VFR BLD CALC: 46.1 % (ref 36–48)
HEMOGLOBIN: 15.4 G/DL (ref 12–16)
LYMPHOCYTES ABSOLUTE: 0.8 K/UL (ref 1–5.1)
LYMPHOCYTES RELATIVE PERCENT: 8.4 %
MCH RBC QN AUTO: 33.6 PG (ref 26–34)
MCHC RBC AUTO-ENTMCNC: 33.4 G/DL (ref 31–36)
MCV RBC AUTO: 100.6 FL (ref 80–100)
MONOCYTES ABSOLUTE: 1.2 K/UL (ref 0–1.3)
MONOCYTES RELATIVE PERCENT: 12.7 %
NEUTROPHILS ABSOLUTE: 7.2 K/UL (ref 1.7–7.7)
NEUTROPHILS RELATIVE PERCENT: 77.5 %
PDW BLD-RTO: 13.4 % (ref 12.4–15.4)
PLATELET # BLD: 189 K/UL (ref 135–450)
PMV BLD AUTO: 9.4 FL (ref 5–10.5)
POTASSIUM REFLEX MAGNESIUM: 5.2 MMOL/L (ref 3.5–5.1)
RBC # BLD: 4.59 M/UL (ref 4–5.2)
SODIUM BLD-SCNC: 141 MMOL/L (ref 136–145)
WBC # BLD: 9.3 K/UL (ref 4–11)

## 2022-01-06 PROCEDURE — 6370000000 HC RX 637 (ALT 250 FOR IP): Performed by: SURGERY

## 2022-01-06 PROCEDURE — 36415 COLL VENOUS BLD VENIPUNCTURE: CPT

## 2022-01-06 PROCEDURE — 87641 MR-STAPH DNA AMP PROBE: CPT

## 2022-01-06 PROCEDURE — 6370000000 HC RX 637 (ALT 250 FOR IP): Performed by: INTERNAL MEDICINE

## 2022-01-06 PROCEDURE — 94761 N-INVAS EAR/PLS OXIMETRY MLT: CPT

## 2022-01-06 PROCEDURE — 85025 COMPLETE CBC W/AUTO DIFF WBC: CPT

## 2022-01-06 PROCEDURE — 2700000000 HC OXYGEN THERAPY PER DAY

## 2022-01-06 PROCEDURE — 6360000002 HC RX W HCPCS: Performed by: INTERNAL MEDICINE

## 2022-01-06 PROCEDURE — 2580000003 HC RX 258: Performed by: INTERNAL MEDICINE

## 2022-01-06 PROCEDURE — 94640 AIRWAY INHALATION TREATMENT: CPT

## 2022-01-06 PROCEDURE — 2060000000 HC ICU INTERMEDIATE R&B

## 2022-01-06 PROCEDURE — 0202U NFCT DS 22 TRGT SARS-COV-2: CPT

## 2022-01-06 PROCEDURE — 80048 BASIC METABOLIC PNL TOTAL CA: CPT

## 2022-01-06 RX ORDER — PREDNISONE 20 MG/1
40 TABLET ORAL DAILY
Status: DISCONTINUED | OUTPATIENT
Start: 2022-01-06 | End: 2022-01-09 | Stop reason: HOSPADM

## 2022-01-06 RX ORDER — PANTOPRAZOLE SODIUM 40 MG/1
40 TABLET, DELAYED RELEASE ORAL
Status: DISCONTINUED | OUTPATIENT
Start: 2022-01-06 | End: 2022-01-09 | Stop reason: HOSPADM

## 2022-01-06 RX ORDER — AZITHROMYCIN 250 MG/1
500 TABLET, FILM COATED ORAL DAILY
Status: DISCONTINUED | OUTPATIENT
Start: 2022-01-06 | End: 2022-01-09 | Stop reason: HOSPADM

## 2022-01-06 RX ADMIN — PANTOPRAZOLE SODIUM 40 MG: 40 TABLET, DELAYED RELEASE ORAL at 11:46

## 2022-01-06 RX ADMIN — ATORVASTATIN CALCIUM 20 MG: 20 TABLET, FILM COATED ORAL at 20:54

## 2022-01-06 RX ADMIN — IPRATROPIUM BROMIDE AND ALBUTEROL 1 PUFF: 20; 100 SPRAY, METERED RESPIRATORY (INHALATION) at 08:22

## 2022-01-06 RX ADMIN — IPRATROPIUM BROMIDE AND ALBUTEROL 1 PUFF: 20; 100 SPRAY, METERED RESPIRATORY (INHALATION) at 16:33

## 2022-01-06 RX ADMIN — DIVALPROEX SODIUM 250 MG: 250 TABLET, DELAYED RELEASE ORAL at 20:54

## 2022-01-06 RX ADMIN — ACETAMINOPHEN 650 MG: 325 TABLET ORAL at 08:08

## 2022-01-06 RX ADMIN — LEVETIRACETAM 1500 MG: 750 TABLET, FILM COATED ORAL at 20:54

## 2022-01-06 RX ADMIN — SODIUM CHLORIDE, PRESERVATIVE FREE 10 ML: 5 INJECTION INTRAVENOUS at 20:55

## 2022-01-06 RX ADMIN — PREDNISONE 40 MG: 20 TABLET ORAL at 09:53

## 2022-01-06 RX ADMIN — LORAZEPAM 1 MG: 1 TABLET ORAL at 08:03

## 2022-01-06 RX ADMIN — MONTELUKAST 10 MG: 10 TABLET, FILM COATED ORAL at 20:54

## 2022-01-06 RX ADMIN — SODIUM CHLORIDE, PRESERVATIVE FREE 10 ML: 5 INJECTION INTRAVENOUS at 08:03

## 2022-01-06 RX ADMIN — DIVALPROEX SODIUM 250 MG: 250 TABLET, DELAYED RELEASE ORAL at 08:08

## 2022-01-06 RX ADMIN — DONEPEZIL HYDROCHLORIDE 5 MG: 5 TABLET, FILM COATED ORAL at 20:54

## 2022-01-06 RX ADMIN — LEVETIRACETAM 1500 MG: 750 TABLET, FILM COATED ORAL at 09:54

## 2022-01-06 RX ADMIN — LEVOFLOXACIN 500 MG: 5 INJECTION, SOLUTION INTRAVENOUS at 16:05

## 2022-01-06 RX ADMIN — IPRATROPIUM BROMIDE AND ALBUTEROL 1 PUFF: 20; 100 SPRAY, METERED RESPIRATORY (INHALATION) at 12:46

## 2022-01-06 RX ADMIN — ACETAMINOPHEN 650 MG: 325 TABLET ORAL at 20:54

## 2022-01-06 RX ADMIN — AZITHROMYCIN MONOHYDRATE 500 MG: 250 TABLET ORAL at 12:57

## 2022-01-06 RX ADMIN — ENOXAPARIN SODIUM 40 MG: 100 INJECTION SUBCUTANEOUS at 08:03

## 2022-01-06 ASSESSMENT — PAIN DESCRIPTION - FREQUENCY: FREQUENCY: CONTINUOUS

## 2022-01-06 ASSESSMENT — PAIN DESCRIPTION - LOCATION: LOCATION: BACK;BUTTOCKS;ABDOMEN

## 2022-01-06 ASSESSMENT — PAIN DESCRIPTION - ONSET: ONSET: ON-GOING

## 2022-01-06 ASSESSMENT — PAIN SCALES - GENERAL
PAINLEVEL_OUTOF10: 0
PAINLEVEL_OUTOF10: 6
PAINLEVEL_OUTOF10: 3
PAINLEVEL_OUTOF10: 0
PAINLEVEL_OUTOF10: 0

## 2022-01-06 ASSESSMENT — PAIN DESCRIPTION - ORIENTATION: ORIENTATION: LOWER

## 2022-01-06 ASSESSMENT — PAIN - FUNCTIONAL ASSESSMENT: PAIN_FUNCTIONAL_ASSESSMENT: PREVENTS OR INTERFERES SOME ACTIVE ACTIVITIES AND ADLS

## 2022-01-06 ASSESSMENT — PAIN DESCRIPTION - PAIN TYPE: TYPE: CHRONIC PAIN

## 2022-01-06 ASSESSMENT — PAIN DESCRIPTION - PROGRESSION: CLINICAL_PROGRESSION: NOT CHANGED

## 2022-01-06 ASSESSMENT — PAIN DESCRIPTION - DESCRIPTORS: DESCRIPTORS: ACHING;DISCOMFORT

## 2022-01-06 NOTE — PLAN OF CARE
Problem: Falls - Risk of:  Goal: Will remain free from falls  Description: Will remain free from falls  Outcome: Ongoing   Pt's bed is in lowest position, brake and bed exit alarm are on, call light and bedside table are within reach. Sitter is at bedside r/t impulsiveness. Problem: Skin Integrity:  Goal: Absence of new skin breakdown  Description: Absence of new skin breakdown  Outcome: Ongoing   Pt shows no new skin breakdown. Problem: Fluid Volume - Deficit:  Goal: Achieves intake and output within specified parameters  Description: Achieves intake and output within specified parameters  Outcome: Ongoing   I/O is being documented in flowsheets. Problem: Gas Exchange - Impaired:  Goal: Levels of oxygenation will improve  Description: Levels of oxygenation will improve  Outcome: Ongoing   Pt is currently on 4L NC and SpO2 has been WNL. Problem: Hyperthermia:  Goal: Ability to maintain a body temperature in the normal range will improve  Description: Ability to maintain a body temperature in the normal range will improve  Outcome: Ongoing   Pt has been afebrile during shift. Problem: Pain:  Goal: Pain level will decrease  Description: Pain level will decrease  Outcome: Ongoing   Pt reported pain at beginning of shift. Pt is currently resting comfortably. Will continue to monitor pt's pain level.
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
## 2022-01-06 NOTE — PROGRESS NOTES
Progress Note    Admit Date: 1/4/2022  Diet: ADULT DIET; Regular; 4 carb choices (60 gm/meal)    CC: SOB, feeling unwell    Interval history: Patient admitted 2 days ago with dx of COPD exacerbation. COVID and flu negative. Patient very lethargic this AM, able to stay awake only for seconds before nodding off mid sentence. Satting low 70s on RA, replaced NC in nose and recovered to 90. Concern for hypercapnia given extreme lethargy. Will check VBG. HPI: \"This is a 73 yo female presenting w/ feeling unwell w/ sx of nausea, vomiting, myalgias and malaise x 1 week. She reports her vomiting finally stopped when she stopped eating- last meal she reports was 2 days ago which was a popsicle and soup. She Also reports a h/o copd and chronic o2 yuse but felt more sob that usual and has had a nonproductive cough. \" - taken from admission H&P    Medications:     Scheduled Meds:   predniSONE  40 mg Oral Daily    atorvastatin  20 mg Oral Nightly    divalproex  250 mg Oral BID    montelukast  10 mg Oral Nightly    sodium chloride flush  5-40 mL IntraVENous 2 times per day    enoxaparin  40 mg SubCUTAneous Daily    albuterol-ipratropium  1 puff Inhalation Q4H WA    donepezil  5 mg Oral Nightly    levETIRAcetam  1,500 mg Oral BID    levofloxacin  500 mg IntraVENous Q24H     Continuous Infusions:   sodium chloride 25 mL (01/05/22 1602)     PRN Meds:sodium chloride flush, sodium chloride, ondansetron **OR** ondansetron, polyethylene glycol, acetaminophen **OR** acetaminophen, LORazepam    Objective:   Vitals:   T-max:  Patient Vitals for the past 8 hrs:   BP Temp Temp src Pulse Resp SpO2   01/06/22 0823     16 93 %   01/06/22 0759 123/68 98.4 °F (36.9 °C) Oral 112 20 90 %   01/06/22 0416 132/77 98.2 °F (36.8 °C) Oral 101 18 94 %       Intake/Output Summary (Last 24 hours) at 1/6/2022 1043  Last data filed at 1/6/2022 0600  Gross per 24 hour   Intake 790 ml   Output 500 ml   Net 290 ml       Review of Systems  Unable to obtain 2/2 lethargy  Physical Exam  Constitutional:       Comments: Lethargic/obtunded   HENT:      Head: Normocephalic and atraumatic. Eyes:      Extraocular Movements: Extraocular movements intact. Conjunctiva/sclera: Conjunctivae normal.      Pupils: Pupils are equal, round, and reactive to light. Cardiovascular:      Rate and Rhythm: Normal rate and regular rhythm. Pulses: Normal pulses. Heart sounds: Normal heart sounds. Pulmonary:      Effort: Pulmonary effort is normal.      Breath sounds: Normal breath sounds. Abdominal:      General: Abdomen is flat. Bowel sounds are normal.      Palpations: Abdomen is soft. Musculoskeletal:         General: Normal range of motion. Cervical back: Normal range of motion and neck supple. Right lower leg: No edema. Left lower leg: No edema. Skin:     General: Skin is warm and dry. Capillary Refill: Capillary refill takes less than 2 seconds. Neurological:      General: No focal deficit present. Mental Status: She is oriented to person, place, and time. Mental status is at baseline. Psychiatric:         Mood and Affect: Mood normal.         Behavior: Behavior normal.         Thought Content: Thought content normal.         Judgment: Judgment normal.         LABS:    CBC:   Recent Labs     01/04/22 2210 01/06/22  0514   WBC 11.6* 9.3   HGB 15.7 15.4   HCT 47.7 46.1    189   MCV 99.4 100.6*     Renal:    Recent Labs     01/04/22 2210 01/06/22  0514    141   K 4.1 5.2*   CL 99 99   CO2 34* 31   BUN 19 23*   CREATININE <0.5* <0.5*   GLUCOSE 102* 74   CALCIUM 9.2 8.7   ANIONGAP 10 11     Hepatic: No results for input(s): AST, ALT, BILITOT, BILIDIR, PROT, LABALBU, ALKPHOS in the last 72 hours. Troponin:   Recent Labs     01/04/22 2210   TROPONINI <0.01     BNP: No results for input(s): BNP in the last 72 hours. Lipids: No results for input(s): CHOL, HDL in the last 72 hours.     Invalid input(s): LDLCALCU, TRIGLYCERIDE  ABGs:  No results for input(s): PHART, VSP8CYD, PO2ART, JAG5SXM, BEART, THGBART, I8OCPEUH, JVT2HTM in the last 72 hours. INR: No results for input(s): INR in the last 72 hours. Lactate: No results for input(s): LACTATE in the last 72 hours. Cultures:  -----------------------------------------------------------------  RAD:   CT CHEST PULMONARY EMBOLISM W CONTRAST   Final Result      1. No evidence of pulmonary embolus. 2. Moderate left basilar airspace consolidation consistent with pneumonia. 3. Severe emphysema. XR CHEST PORTABLE   Final Result   1. Emphysema. 2. No acute airspace disease. Assessment/Plan:      Acute COPD exacerbation:  Likely 2/2 acute tracheobronchitis vs viral  Covid negative, flu negative  Procal negative, consider d/c levofloxacin  Prednisone 40 daily for 5 days     Seizure d/o:  Cont keppra.     GERD:  Place on ppi and sucralfate as pta.     DVT Prophylaxis: Loveox bid  Diet: ADULT DIET;  Regular; 4 carb choices (60 gm/meal)  Code Status: Full Code     PT/OT Eval Status: pending improvement     Claudene Books, MD, PGY-3  01/06/22  10:43 AM    This patient will be staffed and discussed with Anthony rPajapati MD.

## 2022-01-06 NOTE — PROGRESS NOTES
Pt arrived to [facility] 4312 from ED via stretcher. Pt transferred from stretcher to bed via sliding with sheet. VS taken, assessment complete, height and weight obtained, pt oriented to room and educated on call light. 4 eye skin assessment complete and pt denies questions at this time.

## 2022-01-07 LAB
AMMONIA: 63 UMOL/L (ref 11–51)
MRSA SCREEN RT-PCR: NORMAL
REPORT: NORMAL
RESPIRATORY PANEL PCR: NORMAL

## 2022-01-07 PROCEDURE — 6360000002 HC RX W HCPCS: Performed by: INTERNAL MEDICINE

## 2022-01-07 PROCEDURE — 97116 GAIT TRAINING THERAPY: CPT

## 2022-01-07 PROCEDURE — 1200000000 HC SEMI PRIVATE

## 2022-01-07 PROCEDURE — 97166 OT EVAL MOD COMPLEX 45 MIN: CPT

## 2022-01-07 PROCEDURE — 6370000000 HC RX 637 (ALT 250 FOR IP): Performed by: SURGERY

## 2022-01-07 PROCEDURE — 97530 THERAPEUTIC ACTIVITIES: CPT

## 2022-01-07 PROCEDURE — 82306 VITAMIN D 25 HYDROXY: CPT

## 2022-01-07 PROCEDURE — 97161 PT EVAL LOW COMPLEX 20 MIN: CPT

## 2022-01-07 PROCEDURE — 2580000003 HC RX 258: Performed by: INTERNAL MEDICINE

## 2022-01-07 PROCEDURE — 90792 PSYCH DIAG EVAL W/MED SRVCS: CPT | Performed by: NURSE PRACTITIONER

## 2022-01-07 PROCEDURE — 82607 VITAMIN B-12: CPT

## 2022-01-07 PROCEDURE — 97535 SELF CARE MNGMENT TRAINING: CPT

## 2022-01-07 PROCEDURE — 6370000000 HC RX 637 (ALT 250 FOR IP): Performed by: NURSE PRACTITIONER

## 2022-01-07 PROCEDURE — 36415 COLL VENOUS BLD VENIPUNCTURE: CPT

## 2022-01-07 PROCEDURE — 6370000000 HC RX 637 (ALT 250 FOR IP): Performed by: INTERNAL MEDICINE

## 2022-01-07 PROCEDURE — 82140 ASSAY OF AMMONIA: CPT

## 2022-01-07 RX ORDER — ARIPIPRAZOLE 5 MG/1
5 TABLET ORAL DAILY
Status: DISCONTINUED | OUTPATIENT
Start: 2022-01-07 | End: 2022-01-09 | Stop reason: HOSPADM

## 2022-01-07 RX ORDER — BUSPIRONE HYDROCHLORIDE 5 MG/1
5 TABLET ORAL 2 TIMES DAILY
Status: DISCONTINUED | OUTPATIENT
Start: 2022-01-07 | End: 2022-01-09 | Stop reason: HOSPADM

## 2022-01-07 RX ORDER — DONEPEZIL HYDROCHLORIDE 10 MG/1
10 TABLET, FILM COATED ORAL NIGHTLY
Status: DISCONTINUED | OUTPATIENT
Start: 2022-01-07 | End: 2022-01-09 | Stop reason: HOSPADM

## 2022-01-07 RX ORDER — SERTRALINE HYDROCHLORIDE 25 MG/1
25 TABLET, FILM COATED ORAL DAILY
Status: DISCONTINUED | OUTPATIENT
Start: 2022-01-07 | End: 2022-01-09 | Stop reason: HOSPADM

## 2022-01-07 RX ADMIN — DONEPEZIL HYDROCHLORIDE 10 MG: 10 TABLET, FILM COATED ORAL at 21:28

## 2022-01-07 RX ADMIN — ATORVASTATIN CALCIUM 20 MG: 20 TABLET, FILM COATED ORAL at 21:28

## 2022-01-07 RX ADMIN — DIVALPROEX SODIUM 250 MG: 250 TABLET, DELAYED RELEASE ORAL at 21:28

## 2022-01-07 RX ADMIN — ENOXAPARIN SODIUM 40 MG: 100 INJECTION SUBCUTANEOUS at 09:44

## 2022-01-07 RX ADMIN — SODIUM CHLORIDE, PRESERVATIVE FREE 10 ML: 5 INJECTION INTRAVENOUS at 09:46

## 2022-01-07 RX ADMIN — AZITHROMYCIN MONOHYDRATE 500 MG: 250 TABLET ORAL at 09:43

## 2022-01-07 RX ADMIN — LEVETIRACETAM 1500 MG: 750 TABLET, FILM COATED ORAL at 09:44

## 2022-01-07 RX ADMIN — PANTOPRAZOLE SODIUM 40 MG: 40 TABLET, DELAYED RELEASE ORAL at 06:20

## 2022-01-07 RX ADMIN — SERTRALINE HYDROCHLORIDE 25 MG: 25 TABLET ORAL at 17:48

## 2022-01-07 RX ADMIN — ACETAMINOPHEN 650 MG: 325 TABLET ORAL at 17:48

## 2022-01-07 RX ADMIN — ACETAMINOPHEN 650 MG: 325 TABLET ORAL at 06:20

## 2022-01-07 RX ADMIN — MONTELUKAST 10 MG: 10 TABLET, FILM COATED ORAL at 21:28

## 2022-01-07 RX ADMIN — LEVOFLOXACIN 500 MG: 5 INJECTION, SOLUTION INTRAVENOUS at 16:45

## 2022-01-07 RX ADMIN — BUSPIRONE HYDROCHLORIDE 5 MG: 5 TABLET ORAL at 21:28

## 2022-01-07 RX ADMIN — DIVALPROEX SODIUM 250 MG: 250 TABLET, DELAYED RELEASE ORAL at 09:44

## 2022-01-07 RX ADMIN — SODIUM CHLORIDE 25 ML: 9 INJECTION, SOLUTION INTRAVENOUS at 16:42

## 2022-01-07 RX ADMIN — PREDNISONE 40 MG: 20 TABLET ORAL at 09:44

## 2022-01-07 RX ADMIN — ARIPIPRAZOLE 5 MG: 5 TABLET ORAL at 17:48

## 2022-01-07 RX ADMIN — SODIUM CHLORIDE, PRESERVATIVE FREE 10 ML: 5 INJECTION INTRAVENOUS at 21:28

## 2022-01-07 RX ADMIN — LEVETIRACETAM 1500 MG: 750 TABLET, FILM COATED ORAL at 21:28

## 2022-01-07 ASSESSMENT — PAIN DESCRIPTION - FREQUENCY
FREQUENCY: CONTINUOUS

## 2022-01-07 ASSESSMENT — PAIN DESCRIPTION - PAIN TYPE
TYPE: CHRONIC PAIN

## 2022-01-07 ASSESSMENT — PAIN SCALES - GENERAL
PAINLEVEL_OUTOF10: 0
PAINLEVEL_OUTOF10: 3
PAINLEVEL_OUTOF10: 10

## 2022-01-07 ASSESSMENT — PAIN DESCRIPTION - ONSET
ONSET: ON-GOING

## 2022-01-07 ASSESSMENT — PAIN DESCRIPTION - DESCRIPTORS
DESCRIPTORS: ACHING;DISCOMFORT

## 2022-01-07 ASSESSMENT — PAIN DESCRIPTION - PROGRESSION
CLINICAL_PROGRESSION: NOT CHANGED

## 2022-01-07 ASSESSMENT — PAIN - FUNCTIONAL ASSESSMENT
PAIN_FUNCTIONAL_ASSESSMENT: PREVENTS OR INTERFERES SOME ACTIVE ACTIVITIES AND ADLS

## 2022-01-07 ASSESSMENT — PAIN DESCRIPTION - LOCATION
LOCATION: BACK
LOCATION: BACK;HEAD
LOCATION: BACK

## 2022-01-07 ASSESSMENT — PAIN DESCRIPTION - ORIENTATION
ORIENTATION: LOWER
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
## 2022-01-07 NOTE — PROGRESS NOTES
Spoke with patient's sister; Renetta Borrego that is patient's point of contact, provided and update of current plan of care and all questions answered at this time.

## 2022-01-07 NOTE — RT PROTOCOL NOTE
RT Inhaler-Nebulizer Bronchodilator Protocol Note    There is a bronchodilator order in the chart from a provider indicating to follow the RT Bronchodilator Protocol and there is an Initiate RT Inhaler-Nebulizer Bronchodilator Protocol order as well (see protocol at bottom of note). CXR Findings:  XR CHEST PORTABLE    Result Date: 1/4/2022  1. Emphysema. 2. No acute airspace disease. The findings from the last RT Protocol Assessment were as follows:   History Pulmonary Disease: Smoker 15 pack years or more  Respiratory Pattern: Regular pattern and RR 12-20 bpm  Breath Sounds: Slightly diminished and/or crackles  Cough: Strong, spontaneous, non-productive  Indication for Bronchodilator Therapy: Decreased or absent breath sounds  Bronchodilator Assessment Score: 3    Aerosolized bronchodilator medication orders have been revised according to the RT Inhaler-Nebulizer Bronchodilator Protocol below. Respiratory Therapist to perform RT Therapy Protocol Assessment initially then follow the protocol. Repeat RT Therapy Protocol Assessment PRN for score 0-3 or on second treatment, BID, and PRN for scores above 3. No Indications  adjust the frequency to every 6 hours PRN wheezing or bronchospasm, if no treatments needed after 48 hours then discontinue using Per Protocol order mode. If indication present, adjust the RT bronchodilator orders based on the Bronchodilator Assessment Score as indicated below. Use Inhaler orders unless patient has one or more of the following: on home nebulizer, not able to hold breath for 10 seconds, is not alert and oriented, cannot activate and use MDI correctly, or respiratory rate 25 breaths per minute or more, then use the equivalent nebulizer order(s) with same Frequency and PRN reasons based on the score. If a patient is on this medication at home then do not decrease Frequency below that used at home.     0-3  enter or revise RT bronchodilator order(s) to equivalent RT Bronchodilator order with Frequency of every 4 hours PRN for wheezing or increased work of breathing using Per Protocol order mode. 4-6  enter or revise RT Bronchodilator order(s) to two equivalent RT bronchodilator orders with one order with BID Frequency and one order with Frequency of every 4 hours PRN wheezing or increased work of breathing using Per Protocol order mode. 7-10  enter or revise RT Bronchodilator order(s) to two equivalent RT bronchodilator orders with one order with TID Frequency and one order with Frequency of every 4 hours PRN wheezing or increased work of breathing using Per Protocol order mode. 11-13  enter or revise RT Bronchodilator order(s) to one equivalent RT bronchodilator order with QID Frequency and an Albuterol order with Frequency of every 4 hours PRN wheezing or increased work of breathing using Per Protocol order mode. Greater than 13  enter or revise RT Bronchodilator order(s) to one equivalent RT bronchodilator order with every 4 hours Frequency and an Albuterol order with Frequency of every 2 hours PRN wheezing or increased work of breathing using Per Protocol order mode. RT to enter RT Home Evaluation for COPD & MDI Assessment order using Per Protocol order mode.     Electronically signed by Hillary Salazar RCP on 1/6/2022 at 10:15 PM

## 2022-01-07 NOTE — PROGRESS NOTES
Hospital Medicine History & Physical      PCP: No primary care provider on file. Date of Admission: 1/4/2022    Subjective:  Reports feeling better. No cp. Past Medical History:    COPD  GERD  Depression    Past Surgical History:    Surgery Date Site/Laterality Comments   TONSILLECTOMY          TUBAL LIGATION          UPPER GASTROINTESTINAL ENDOSCOPY        Medications Prior to Admission:      Prior to Admission medications    Medication Sig Start Date End Date Taking? Authorizing Provider   albuterol sulfate HFA (VENTOLIN HFA) 108 (90 Base) MCG/ACT inhaler Inhale 2 puffs into the lungs every 6 hours as needed for Wheezing   Yes Historical Provider, MD   magnesium hydroxide (MILK OF MAGNESIA) 400 MG/5ML suspension Take 30 mLs by mouth daily as needed for Constipation   Yes Historical Provider, MD   acetaminophen (TYLENOL) 325 MG tablet Take 650 mg by mouth every 6 hours as needed (headache)    Yes Historical Provider, MD   divalproex (DEPAKOTE) 250 MG DR tablet Take 250 mg by mouth 2 times daily ?  DR   Yes Historical Provider, MD   nicotine (NICODERM CQ) 21 MG/24HR Place 1 patch onto the skin every 24 hours   Yes Historical Provider, MD   sucralfate (CARAFATE) 1 GM tablet Take 1 g by mouth 4 times daily   Yes Historical Provider, MD   atorvastatin (LIPITOR) 20 MG tablet Take 20 mg by mouth nightly   Yes Historical Provider, MD   ipratropium-albuterol (DUONEB) 0.5-2.5 (3) MG/3ML SOLN nebulizer solution Inhale 1 vial into the lungs every 6 hours as needed for Shortness of Breath   Yes Historical Provider, MD   montelukast (SINGULAIR) 10 MG tablet Take 10 mg by mouth nightly   Yes Historical Provider, MD   omeprazole (PRILOSEC) 40 MG delayed release capsule Take 40 mg by mouth daily   Yes Historical Provider, MD   levETIRAcetam (KEPPRA) 500 MG tablet Take 1,500 mg by mouth 2 times daily   Yes Historical Provider, MD   donepezil (ARICEPT) 5 MG tablet Take 5 mg by mouth nightly   Yes Historical Provider, MD aluminum & magnesium hydroxide-simethicone (MAALOX) 200-200-20 MG/5ML SUSP suspension Take 30 mLs by mouth every 4 hours as needed for Indigestion   Yes Historical Provider, MD       Allergies:  Pcn [penicillins] and Sulfa antibiotics    Social History:      TOBACCO:   reports that she has been smoking. She does not have any smokeless tobacco history on file. ETOH:   has no history on file for alcohol use. E-Cigarettes/Vaping Use     Questions Responses    E-Cigarette/Vaping Use     Start Date     Passive Exposure     Quit Date     Counseling Given     Comments         Family History:    Heart disease Brother        Cancer Mother    breast   Hypertension Mother        Seizures Paternal Uncle        Drug abuse Son 1     PHYSICAL EXAM PERFORMED:    /69   Pulse 110   Temp 98.1 °F (36.7 °C) (Oral)   Resp 18   Ht 5' 4\" (1.626 m)   Wt 110 lb 14.4 oz (50.3 kg)   SpO2 93%   Breastfeeding No   BMI 19.04 kg/m²     General appearance: NAD  HEENT:    Extra ocular muscles intact. Conjunctivae/corneas clear. Neck: Supple, with full range of motion. Respiratory:  Normal respiratory effort. Cardiovascular:  Tachy  Abdomen: Soft, non-tender, non-distended   Musculoskeletal:  No clubbing, cyanosis or edema bilaterally. Skin:No rashes or lesions. Neurologic:  Neurovascularly intact without any focal sensory/motor deficits.  Cranial nerves: II-XII intact, grossly non-focal.    Labs:     Recent Labs     01/04/22 2210 01/06/22  0514   WBC 11.6* 9.3   HGB 15.7 15.4   HCT 47.7 46.1    189     Recent Labs     01/04/22 2210 01/06/22  0514    141   K 4.1 5.2*   CL 99 99   CO2 34* 31   BUN 19 23*   CREATININE <0.5* <0.5*   CALCIUM 9.2 8.7       Recent Labs     01/04/22 2210   TROPONINI <0.01       Urinalysis:      Lab Results   Component Value Date    NITRU Negative 01/04/2022    BLOODU Negative 01/04/2022    SPECGRAV 1.020 01/04/2022    GLUCOSEU Negative 01/04/2022       Radiology:     CT CHEST PULMONARY EMBOLISM W CONTRAST   Final Result      1. No evidence of pulmonary embolus. 2. Moderate left basilar airspace consolidation consistent with pneumonia. 3. Severe emphysema. XR CHEST PORTABLE   Final Result   1. Emphysema. 2. No acute airspace disease. ASSESSMENT/ PLAN:    Acute COPD exacerbation:  2/2 tracheobronchitis covid negative. Sx improving/ wean o2. Cont abx for cap. Cont steroid burst.    Seizure d/o:  Cont keppra. Anxiety:  Add prn ativan. GERD:  Place on ppi and sucralfate as pta. DVT Prophylaxis: Loveox bid  Diet: ADULT DIET;  Regular; 4 carb choices (60 gm/meal)  Code Status: Full Code    PT/OT Eval Status: pending improvement    Dispo - Inpatient       Ari Lorenz MD

## 2022-01-07 NOTE — PLAN OF CARE
Problem: Falls - Risk of:  Goal: Will remain free from falls  Description: Will remain free from falls  1/7/2022 0310 by Luanne Quezada  Outcome: Ongoing   Pt's bed is in lowest position, brake and bed exit alarm are on, call light and bedside table are within reach. Sitter is at bedside r/t impulsiveness. Problem: Skin Integrity:  Goal: Absence of new skin breakdown  Description: Absence of new skin breakdown  1/7/2022 0310 by Luanne Quezada  Outcome: Ongoing   Pt shows no new skin breakdown. Incontinence is being assessed as needed. Problem: Fluid Volume - Deficit:  Goal: Achieves intake and output within specified parameters  Description: Achieves intake and output within specified parameters  Outcome: Ongoing   I/O is being documented in flowsheets. Problem: Gas Exchange - Impaired:  Goal: Levels of oxygenation will improve  Description: Levels of oxygenation will improve  Outcome: Ongoing   Pt is currently on 4L O2 NC and SpO2 has been WNL. Problem: Hyperthermia:  Goal: Ability to maintain a body temperature in the normal range will improve  Description: Ability to maintain a body temperature in the normal range will improve  1/7/2022 0310 by Luanne Quezada  Outcome: Ongoing   Pt has been afebrile during shift. Problem: Pain:  Goal: Pain level will decrease  Description: Pain level will decrease  Outcome: Ongoing   Pt reported pain at beginning of shift and was given PRN Tylenol. Will continue to monitor pt's pain level.

## 2022-01-07 NOTE — PROGRESS NOTES
Physical Therapy    Facility/Department: [facility] [address] [facility]  Initial Assessment / treatment / discharge    NAME: Mateo Jefferson  : 1955  MRN: [id number]    Date of Service: 2022    Discharge Recommendations: Mateo Jefferson scored a 20/24 on the AM-PAC short mobility form. At this time, no further PT is recommended upon discharge. Recommend patient returns to prior setting with prior services. PT Equipment Recommendations  Equipment Needed: No    Assessment   Assessment: Pt is 74  y.o. female admit from IP psych with PNA. Pt demos safe transfers and ambulation. Demos cognitive deficits which require min cues to stay on task at times. Pt is forgetful. No acute therapy needs although pt is not safe to return to her prior apt alone. Discussed with RN. Will sign off. Decision Making: Low Complexity  PT Education: PT Role  Patient Education: pt demonstrated understanding; rec reinforcement  REQUIRES PT FOLLOW UP: No       Patient Diagnosis(es): The encounter diagnosis was HCAP (healthcare-associated pneumonia). has no past medical history on file. has no past surgical history on file. Restrictions  Position Activity Restriction  Other position/activity restrictions: up as tolerated  Vision/Hearing  Vision: Impaired  Vision Exceptions: Wears glasses at all times  Hearing: Within functional limits     Subjective  General  Chart Reviewed: Yes  Additional Pertinent Hx: Admit  from inpatient psych facility with hypoxia, cough; chest CT: (-) PE; (+) PNA; COVID and flu negative; PMHx: none listed  Referring Practitioner: Chavez Kellogg MD  Diagnosis: PNA  Subjective  Subjective: Pt alert, pleasant, and agreeable to PT. Denies pain. Pt describes her plans for her birthday in 2 weeks include traveling to [place] with a General to meet the [place]. Denies pain.             Orientation  Orientation  Overall Orientation Status: Impaired (oriented to self, states incorrect place, oriented to month and year)  Social/Functional History  Social/Functional History  Lives With: Alone  Type of Home: Apartment  Home Layout: One level  Home Access: Level entry  Bathroom Shower/Tub: Tub/Shower unit  Bathroom Toilet: Standard  Bathroom Equipment: Grab bars in shower,Shower chair  Home Equipment: 4 wheeled walker,Oxygen,Cane,Alert Button,Sock aid,Reacher (4L)  ADL Assistance: Needs assistance (supervision from [address] [place] [address] for showers 1x/week; indep sponge bathing other days; indep with dressing)  Homemaking Assistance: Needs assistance (HHA 2x/week to assist with meals, cleaning; indep with laundry)  Ambulation Assistance: Independent (ambulates without AD most of the time, uses cane vs rollator \"only when I have to\")  Transfer Assistance: Independent  Active : Yes  Additional Comments: above info per pt report - questionable historian  Cognition        Objective          AROM RLE (degrees)  RLE AROM: WFL  AROM LLE (degrees)  LLE AROM : WFL  Strength RLE  Strength RLE: WNL  Strength LLE  Strength LLE: WNL        Bed mobility  Supine to Sit: Modified independent (HOB elevated)  Scooting: Independent  Transfers  Sit to Stand: Stand by assistance  Stand to sit: Stand by assistance  Ambulation  Ambulation?: Yes  Ambulation 1  Device: No Device  Other Apparatus: O2 (4L)  Assistance: Stand by assistance  Quality of Gait: appropriate isabell and stride length, steady gait, min v/c to stay on task  Distance: 8 ft x 2; 120 ft     Balance  Sitting - Static: Good  Sitting - Dynamic: Good  Standing - Static: Good  Standing - Dynamic: Good    Exercises  X 10 B heel slides, hip ABD, APs with cues for technique  Treatment included gait and transfer training, pt education.   Plan   Plan  Times per week: d/c acute PT  Safety Devices  Type of devices: Call light within reach,Chair alarm in place,Nurse notified,Gait belt,Left in chair,Sitter present    G-Code       OutComes Score                                                  AM-PAC Score  AM-PAC Inpatient Mobility Raw Score : 20 (01/07/22 0846)  AM-PAC Inpatient T-Scale Score : 47.67 (01/07/22 0846)  Mobility Inpatient CMS 0-100% Score: 35.83 (01/07/22 0846)  Mobility Inpatient CMS G-Code Modifier : CJ (01/07/22 9584)          Goals          Therapy Time   Individual Concurrent Group Co-treatment   Time In 0815         Time Out 0908         Minutes 53                 Timed Code Treatment Minutes:  38    Total Treatment Minutes:  [address] [place] [id number]
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
## 2022-01-07 NOTE — PLAN OF CARE
Problem: Falls - Risk of:  Goal: Will remain free from falls  Description: Will remain free from falls  Outcome: Met This Shift     Problem: Skin Integrity:  Goal: Absence of new skin breakdown  Description: Absence of new skin breakdown  Outcome: Met This Shift     Problem: Hyperthermia:  Goal: Ability to maintain a body temperature in the normal range will improve  Description: Ability to maintain a body temperature in the normal range will improve  Outcome: Met This Shift

## 2022-01-07 NOTE — PROGRESS NOTES
Occupational Therapy   Occupational Therapy Initial Assessment /Treatment/Discharge  Date: 2022   Patient Name: Hermes Ernandez  MRN: [id number]     : 1955    Date of Service: 2022    Discharge Recommendations: Hermes Ernandez scored a 20/24 on the [facility] ADL Inpatient form. At this time, no further OT is recommended upon discharge. Recommend patient returns to prior setting with prior services. OT Equipment Recommendations  Equipment Needed: No    Assessment   Assessment: Pt appears to be at or close to her functional baselin. Pt was admitted [facility]. Physically, pt does well with self-care, but req some cues. Pt's functional mobility is SBA-supervision. No acute OT needs identified. Prognosis: Good  Decision Making: Medium Complexity  OT Education: OT Role  Patient Education: Pt verbalized understanding  No Skilled OT: No OT goals identified; At baseline function  REQUIRES OT FOLLOW UP: No  Activity Tolerance  Activity Tolerance: Patient Tolerated treatment well  Safety Devices  Safety Devices in place: Yes  Type of devices: Left in chair;Call light within reach; Chair alarm in place;Nurse notified         Restrictions  Position Activity Restriction  Other position/activity restrictions: up as tolerated    Subjective   General  Chart Reviewed: Yes  Additional Pertinent Hx: Pt admitted from [facility] psychiatric care 22 shrtness of breath. CT chest= 1. No evidence of pulmonary embolus. 2. Moderate left basilar airspace consolidation consistent with pneumonia. 3. Severe emphysema. PMH includes: COPD  Family / Caregiver Present: No  Referring Practitioner: Dr. Kelechi Mckenzie  Diagnosis: HCAP  Subjective  Subjective: Pt in bed upon entry. Pt agreeable to OOB activity.   Patient Currently in Pain: No  Social/Functional History  Social/Functional History  Lives With: Alone  Type of Home: Apartment  Home Layout: One level  Home Access: Level entry  Bathroom Shower/Tub: Tub/Shower unit  Bathroom Toilet: Standard  Bathroom Equipment: Grab bars in shower,Shower chair  Home Equipment: 4 wheeled walker,Oxygen,Cane,Alert Button,Sock aid,Reacher (4L)  ADL Assistance: Needs assistance (supervision from [facility] for showers 1x/week; indep sponge bathing other days; indep with dressing)  Homemaking Assistance: Needs assistance (HHA 2x/week to assist with meals, cleaning; indep with laundry)  Ambulation Assistance: Independent (ambulates without AD most of the time, uses cane vs rollator \"only when I have to\")  Transfer Assistance: Independent  Active :  Yes  Additional Comments: above info per pt report - questionable historian       Objective   Vision: Impaired  Vision Exceptions: Wears glasses at all times  Hearing: Within functional limits    Orientation  Overall Orientation Status: Impaired  Orientation Level: Oriented to person     Balance  Sitting Balance: Supervision  Standing Balance: Stand by assistance  Standing Balance  Time: ~5 min, ~3 min  Activity: bathroom mobility/activity, walk in martin  Functional Mobility  Functional - Mobility Device: No device  Activity: To/from bathroom (walk in martin)  Assist Level: Stand by assistance (+cues)  Toilet Transfers  Toilet - Technique: Ambulating  Equipment Used: Standard toilet  Toilet Transfer: Stand by assistance (to supervision)  ADL  Grooming: Supervision  UE Bathing: Supervision;Verbal cueing (after washing, pt started washing all over again.)  LE Dressing: Supervision  Toileting:  (denied need)  Tone RUE  RUE Tone: Normotonic  Tone LUE  LUE Tone: Normotonic  Coordination  Movements Are Fluid And Coordinated: Yes     Bed mobility  Supine to Sit: Modified independent (HOB elevated)  Scooting: Independent  Transfers  Stand Step Transfers: Stand by assistance (to supervision)  Sit to stand: Stand by assistance (to supervision)  Stand to sit: Stand by assistance (to supervision)     Cognition  Overall Cognitive Status: Exceptions  Arousal/Alertness: Appropriate responses to stimuli  Following Commands: Inconsistently follows commands  Memory: Decreased recall of biographical Information;Decreased recall of recent events;Decreased short term memory  Safety Judgement: Decreased awareness of need for assistance;Decreased awareness of need for safety  Insights: Decreased awareness of deficits                 LUE AROM (degrees)  LUE AROM : WFL  RUE AROM (degrees)  RUE AROM : WFL  LUE Strength  Gross LUE Strength: WFL  RUE Strength  Gross RUE Strength: WFL     Hand Dominance  Hand Dominance: Right         Treatment included ADL and transfer training.      Plan   Plan  Plan Comment: Discharge pt from acute OT    AM-PAC Score        AM-PAC Inpatient Daily Activity Raw Score: 20 (01/07/22 1105)  AM-PAC Inpatient ADL T-Scale Score : 42.03 (01/07/22 1105)  ADL Inpatient CMS 0-100% Score: 38.32 (01/07/22 1105)  ADL Inpatient CMS G-Code Modifier : CJ (01/07/22 1105)    Goals    No goals indicated       Therapy Time   Individual Concurrent Group Co-treatment   Time In 0820         Time Out 0905         Minutes 45         Timed Code Treatment Minutes: [address] [place] [address], OTR/L [id number]

## 2022-01-08 LAB
ANION GAP SERPL CALCULATED.3IONS-SCNC: 10 MMOL/L (ref 3–16)
BUN BLDV-MCNC: 21 MG/DL (ref 7–20)
CALCIUM SERPL-MCNC: 8.6 MG/DL (ref 8.3–10.6)
CHLORIDE BLD-SCNC: 100 MMOL/L (ref 99–110)
CO2: 31 MMOL/L (ref 21–32)
CREAT SERPL-MCNC: <0.5 MG/DL (ref 0.6–1.2)
GFR AFRICAN AMERICAN: >60
GFR NON-AFRICAN AMERICAN: >60
GLUCOSE BLD-MCNC: 92 MG/DL (ref 70–99)
HCT VFR BLD CALC: 44.9 % (ref 36–48)
HEMOGLOBIN: 15.1 G/DL (ref 12–16)
MCH RBC QN AUTO: 33.2 PG (ref 26–34)
MCHC RBC AUTO-ENTMCNC: 33.6 G/DL (ref 31–36)
MCV RBC AUTO: 98.8 FL (ref 80–100)
PDW BLD-RTO: 12.9 % (ref 12.4–15.4)
PLATELET # BLD: 227 K/UL (ref 135–450)
PMV BLD AUTO: 9.5 FL (ref 5–10.5)
POTASSIUM SERPL-SCNC: 3.8 MMOL/L (ref 3.5–5.1)
RBC # BLD: 4.55 M/UL (ref 4–5.2)
SARS-COV-2, NAAT: NOT DETECTED
SODIUM BLD-SCNC: 141 MMOL/L (ref 136–145)
VALPROIC ACID LEVEL: 25 UG/ML (ref 50–100)
VITAMIN B-12: 630 PG/ML (ref 211–911)
VITAMIN D 25-HYDROXY: 10.5 NG/ML
WBC # BLD: 6.7 K/UL (ref 4–11)

## 2022-01-08 PROCEDURE — 6370000000 HC RX 637 (ALT 250 FOR IP): Performed by: SURGERY

## 2022-01-08 PROCEDURE — 87635 SARS-COV-2 COVID-19 AMP PRB: CPT

## 2022-01-08 PROCEDURE — 2700000000 HC OXYGEN THERAPY PER DAY

## 2022-01-08 PROCEDURE — 80048 BASIC METABOLIC PNL TOTAL CA: CPT

## 2022-01-08 PROCEDURE — 80164 ASSAY DIPROPYLACETIC ACD TOT: CPT

## 2022-01-08 PROCEDURE — 6370000000 HC RX 637 (ALT 250 FOR IP): Performed by: INTERNAL MEDICINE

## 2022-01-08 PROCEDURE — 94761 N-INVAS EAR/PLS OXIMETRY MLT: CPT

## 2022-01-08 PROCEDURE — 36415 COLL VENOUS BLD VENIPUNCTURE: CPT

## 2022-01-08 PROCEDURE — 6360000002 HC RX W HCPCS: Performed by: INTERNAL MEDICINE

## 2022-01-08 PROCEDURE — 2580000003 HC RX 258: Performed by: INTERNAL MEDICINE

## 2022-01-08 PROCEDURE — 6370000000 HC RX 637 (ALT 250 FOR IP): Performed by: NURSE PRACTITIONER

## 2022-01-08 PROCEDURE — 85027 COMPLETE CBC AUTOMATED: CPT

## 2022-01-08 PROCEDURE — 1200000000 HC SEMI PRIVATE

## 2022-01-08 RX ORDER — ARIPIPRAZOLE 5 MG/1
5 TABLET ORAL DAILY
Qty: 30 TABLET | Refills: 0 | Status: SHIPPED | OUTPATIENT
Start: 2022-01-09

## 2022-01-08 RX ORDER — LEVOFLOXACIN 750 MG/1
750 TABLET ORAL DAILY
Qty: 5 TABLET | Refills: 0 | Status: SHIPPED | OUTPATIENT
Start: 2022-01-08 | End: 2022-01-13

## 2022-01-08 RX ORDER — BUSPIRONE HYDROCHLORIDE 5 MG/1
5 TABLET ORAL 2 TIMES DAILY
Qty: 30 TABLET | Refills: 0 | Status: SHIPPED | OUTPATIENT
Start: 2022-01-08

## 2022-01-08 RX ORDER — SERTRALINE HYDROCHLORIDE 25 MG/1
25 TABLET, FILM COATED ORAL DAILY
Qty: 30 TABLET | Refills: 0 | Status: SHIPPED | OUTPATIENT
Start: 2022-01-09

## 2022-01-08 RX ADMIN — ATORVASTATIN CALCIUM 20 MG: 20 TABLET, FILM COATED ORAL at 20:02

## 2022-01-08 RX ADMIN — ENOXAPARIN SODIUM 40 MG: 100 INJECTION SUBCUTANEOUS at 11:00

## 2022-01-08 RX ADMIN — MONTELUKAST 10 MG: 10 TABLET, FILM COATED ORAL at 20:01

## 2022-01-08 RX ADMIN — LEVETIRACETAM 1500 MG: 750 TABLET, FILM COATED ORAL at 21:39

## 2022-01-08 RX ADMIN — PREDNISONE 40 MG: 20 TABLET ORAL at 10:59

## 2022-01-08 RX ADMIN — BUSPIRONE HYDROCHLORIDE 5 MG: 5 TABLET ORAL at 21:39

## 2022-01-08 RX ADMIN — ACETAMINOPHEN 650 MG: 325 TABLET ORAL at 06:19

## 2022-01-08 RX ADMIN — DIVALPROEX SODIUM 250 MG: 250 TABLET, DELAYED RELEASE ORAL at 10:59

## 2022-01-08 RX ADMIN — SERTRALINE HYDROCHLORIDE 25 MG: 25 TABLET ORAL at 11:00

## 2022-01-08 RX ADMIN — ACETAMINOPHEN 650 MG: 325 TABLET ORAL at 10:59

## 2022-01-08 RX ADMIN — LEVETIRACETAM 1500 MG: 750 TABLET, FILM COATED ORAL at 11:00

## 2022-01-08 RX ADMIN — AZITHROMYCIN MONOHYDRATE 500 MG: 250 TABLET ORAL at 11:00

## 2022-01-08 RX ADMIN — SODIUM CHLORIDE, PRESERVATIVE FREE 10 ML: 5 INJECTION INTRAVENOUS at 11:03

## 2022-01-08 RX ADMIN — DIVALPROEX SODIUM 250 MG: 250 TABLET, DELAYED RELEASE ORAL at 20:02

## 2022-01-08 RX ADMIN — BUSPIRONE HYDROCHLORIDE 5 MG: 5 TABLET ORAL at 11:00

## 2022-01-08 RX ADMIN — PANTOPRAZOLE SODIUM 40 MG: 40 TABLET, DELAYED RELEASE ORAL at 06:19

## 2022-01-08 RX ADMIN — DONEPEZIL HYDROCHLORIDE 10 MG: 10 TABLET, FILM COATED ORAL at 20:02

## 2022-01-08 RX ADMIN — ARIPIPRAZOLE 5 MG: 5 TABLET ORAL at 10:59

## 2022-01-08 ASSESSMENT — PAIN SCALES - GENERAL
PAINLEVEL_OUTOF10: 3
PAINLEVEL_OUTOF10: 0
PAINLEVEL_OUTOF10: 10
PAINLEVEL_OUTOF10: 0

## 2022-01-08 NOTE — DISCHARGE SUMMARY
Hospital Medicine Discharge Summary    Patient ID: Tatum Matamoros      Patient's PCP: No primary care provider on file. Admit Date: 1/4/2022     Discharge Date:   1/8/2022    Admitting Provider: José Obrien MD     Discharge Provider: Devaughn Rivera MD     Discharge Diagnoses: Active Hospital Problems    Diagnosis     HAP (hospital-acquired pneumonia) [J18.9, Y95]      The patient was seen and examined on day of discharge and this discharge summary is in conjunction with any daily progress note from day of discharge. Hospital Course  Acute COPD exacerbation:  2/2 pneumonia- commuity acquired- poa. Covid 19 neg. Now resolved and pt at baseline. Wean o2 as tolerated. Change abx to po on discharge. No further steroids needed. Seizure d/o:  Cont keppra.     Depression w/ acute anxiety/ delirium:  Psych consulted- recs noted and appreciated. Started on Abilify, buspar and zoloft per psych. Needs inpatient psych on dc.     GERD:  Place on ppi and sucralfate as pta. Physical Exam Performed:     /73   Pulse 93   Temp 97.9 °F (36.6 °C) (Oral)   Resp 16   Ht 5' 4\" (1.626 m)   Wt 110 lb 14.4 oz (50.3 kg)   SpO2 90%   Breastfeeding No   BMI 19.04 kg/m²     General appearance:  No apparent distress, appears stated age and cooperative. HEENT:  Normal cephalic, atraumatic without obvious deformity. Neck: Supple, with full range of motion  Respiratory:  Normal respiratory effort. No distress  Cardiovascular:  Regular rate and rhythm  Abdomen: Soft, non-tender, non-distended with normal bowel sounds. Musculoskeletal:  No clubbing, cyanosis or edema bilaterally. Skin: Skin color, texture, turgor normal.  No rashes or lesions. Neurologic:  grossly non-focal.  Peripheral Pulses: +2 palpable, equal bilaterally     Labs:  For convenience and continuity at follow-up the following most recent labs are provided:      CBC:    Lab Results   Component Value Date    WBC 6.7 01/08/2022    HGB 15.1 01/08/2022    HCT 44.9 01/08/2022     01/08/2022       Renal:    Lab Results   Component Value Date     01/08/2022    K 3.8 01/08/2022    K 5.2 01/06/2022     01/08/2022    CO2 31 01/08/2022    BUN 21 01/08/2022    CREATININE <0.5 01/08/2022    CALCIUM 8.6 01/08/2022         Significant Diagnostic Studies    Radiology:   CT CHEST PULMONARY EMBOLISM W CONTRAST   Final Result      1. No evidence of pulmonary embolus. 2. Moderate left basilar airspace consolidation consistent with pneumonia. 3. Severe emphysema. XR CHEST PORTABLE   Final Result   1. Emphysema. 2. No acute airspace disease. Consults:     PHARMACY TO DOSE VANCOMYCIN  IP CONSULT TO HOSPITALIST  IP CONSULT TO PSYCHIATRY    Disposition:  Inpatient psych     Condition at Discharge: Stable    Discharge Instructions/Follow-up:  Psych    Code Status:  Full Code     Activity: activity as tolerated    Diet: regular diet      Discharge Medications:     Current Discharge Medication List           Details   busPIRone (BUSPAR) 5 MG tablet Take 1 tablet by mouth 2 times daily  Qty: 30 tablet, Refills: 0      sertraline (ZOLOFT) 25 MG tablet Take 1 tablet by mouth daily  Qty: 30 tablet, Refills: 0      ARIPiprazole (ABILIFY) 5 MG tablet Take 1 tablet by mouth daily  Qty: 30 tablet, Refills: 0      levoFLOXacin (LEVAQUIN) 750 MG tablet Take 1 tablet by mouth daily for 5 days  Qty: 5 tablet, Refills: 0              Details   albuterol sulfate HFA (VENTOLIN HFA) 108 (90 Base) MCG/ACT inhaler Inhale 2 puffs into the lungs every 6 hours as needed for Wheezing      magnesium hydroxide (MILK OF MAGNESIA) 400 MG/5ML suspension Take 30 mLs by mouth daily as needed for Constipation      acetaminophen (TYLENOL) 325 MG tablet Take 650 mg by mouth every 6 hours as needed (headache)       divalproex (DEPAKOTE) 250 MG DR tablet Take 250 mg by mouth 2 times daily ?  DR      nicotine (NICODERM CQ) 21 MG/24HR Place 1 patch onto the skin every 24 hours      sucralfate (CARAFATE) 1 GM tablet Take 1 g by mouth 4 times daily      atorvastatin (LIPITOR) 20 MG tablet Take 20 mg by mouth nightly      ipratropium-albuterol (DUONEB) 0.5-2.5 (3) MG/3ML SOLN nebulizer solution Inhale 1 vial into the lungs every 6 hours as needed for Shortness of Breath      montelukast (SINGULAIR) 10 MG tablet Take 10 mg by mouth nightly      omeprazole (PRILOSEC) 40 MG delayed release capsule Take 40 mg by mouth daily      levETIRAcetam (KEPPRA) 500 MG tablet Take 1,500 mg by mouth 2 times daily      donepezil (ARICEPT) 5 MG tablet Take 5 mg by mouth nightly      aluminum & magnesium hydroxide-simethicone (MAALOX) 200-200-20 MG/5ML SUSP suspension Take 30 mLs by mouth every 4 hours as needed for Indigestion             Time Spent on discharge is more than 35 minutes in the examination, evaluation, counseling and review of medications and discharge plan. Signed:    Britni Brewster MD   1/8/2022      Thank you No primary care provider on file. for the opportunity to be involved in this patient's care. If you have any questions or concerns please feel free to contact me at [phone].

## 2022-01-08 NOTE — CARE COORDINATION
Case Management Assessment            Discharge Note                    Date / Time of Note: 1/8/2022 3:51 PM                  Discharge Note Completed by: Sharath Forde RN    Patient Name: Jessica Montes   YOB: 1955  Diagnosis: HCAP (healthcare-associated pneumonia) [J18.9]  HAP (hospital-acquired pneumonia) [J18.9, Y95]   Date / Time: 1/4/2022  8:52 PM    Current PCP: No primary care provider on file. Clinic patient: No    Hospitalization in the last 30 days: No    Advance Directives:  Code Status: Full Code  [place] DNR form completed and on chart: No    Financial:  Payor: Angelique Fritz / Plan: [address] / Product Type: *No Product type* /      Pharmacy:  No Pharmacies [facility] medications?: Potential Assistance Purchasing Medications: No  Assistance provided by Case Management: None at this time    Does patient want to participate in local refill/ meds to beds program?:      Meds To Airbnb Rules:  1. Can ONLY be done Monday- Friday between 8:30am-5pm  2. Prescription(s) must be in pharmacy by 3pm to be filled same day  3. Copy of patient's insurance/ prescription drug card and patient face sheet must be sent along with the prescription(s)  4. Cost of Rx cannot be added to hospital bill. If financial assistance is needed, please contact unit  or ;  or  CANNOT provide pharmacy voucher for patients co-pays  5.  Patients can then  the prescription on their way out of the hospital at discharge, or pharmacy can deliver to the bedside if staff is available. (payment due at time of pick-up or delivery - cash, check, or card accepted)     Able to afford home medications/ co-pay costs: Yes    ADLS:  Current PT AM-PAC Score: 20 /24  Current OT AM-PAC Score: 20 /24      DISCHARGE Disposition: Other: [facility] Phone: [phone]  Fax: [phone]    LOC at discharge: Not Applicable  SAMANTHA Completed: Yes    Notification completed in HENS/PAS?:  Not Applicable    IMM Completed:   Not Indicated    Transportation:  Transportation PLAN for discharge: EMS transportation   Mode of Transport: Ambulance stretcher - BLS  Reason for medical transport: Not Applicable  Name of [facility],[address]: [address]  Phone: [phone]  Time of Transport: 12 Midnight 01/09    Transport form completed: Yes      Additional CM Notes: CM [facility] with Mariia at [facility], can take patient back, wants rapid covid showing negative. Tentative set transport for midnight (soonest we could get). Advised nurse Darrell Jacob that if test were to come back positive, we will need to call [facility] at [phone] to advise so they can place her in the appropriate room. Spoke with patient sister Adwoa Correa who is agreement with discharge plan. The Plan for Transition of Care is related to the following treatment goals of HCAP (healthcare-associated pneumonia) [J18.9]  HAP (hospital-acquired pneumonia) [J18.9, Y95]    The Patient and/or patient representative Ryan Martinez and her family were provided with a choice of provider and agrees with the discharge plan No    Freedom of choice list was provided with basic dialogue that supports the patient's individualized plan of care/goals and shares the quality data associated with the providers.  No    Care Transitions patient: No    Sharath Forde RN  [facility]  Case Management Department  Ph: [phone]

## 2022-01-08 NOTE — PROGRESS NOTES
4 Eyes Admission Assessment     I agree as the admission nurse that 2 RN's have performed a thorough Head to Toe Skin Assessment on the patient. ALL assessment sites listed below have been assessed on admission. Areas assessed by both nurses:   [x]   Head, Face, and Ears   [x]   Shoulders, Back, and Chest  [x]   Arms, Elbows, and Hands   [x]   Coccyx, Sacrum, and Ischium  [x]   Legs, Feet, and Heels        Does the Patient have Skin Breakdown?  Redness to coccyx, dried skin tear to RFA/wrist         Froilan Prevention initiated:  YES  Wound Care Orders initiated:  NO      WOC nurse consulted for Pressure Injury (Stage 3,4, Unstageable, DTI, NWPT, and Complex wounds) or Froilan score 18 or lower:  YES     Nurse 1 eSignature: Electronically signed by Segun Romero RN on 1/8/22 at 7:14 AM EST    **SHARE this note so that the co-signing nurse is able to place an eSignature**    Nurse 2 eSignature: Electronically signed by Maryam Melendrez RN on 1/8/22 at 7:32 AM EST

## 2022-01-08 NOTE — DISCHARGE INSTR - COC
Continuity of Care Form    Patient Name: Kalyn Linares   :  1955  MRN:  [id number]    Admit date:  2022  Discharge date:  2022    Code Status Order: Full Code   Advance Directives:      Admitting Physician:  Ambreen Schmitz MD  PCP: No primary care provider on file. Discharging Nurse: Mig[place] Unit/Room#: 6486/6103-38  Discharging Unit Phone Number: [phone]    Emergency Contact:   Extended Emergency Contact Information  Primary Emergency Contact: Dorie[address] Tomy Rodrigues [place] [address]  Mobile Phone: [phone]  Relation: Other    Past Surgical History:  No past surgical history on file.     Immunization History:   Immunization History   Administered Date(s) Administered    COVID-19, J&J, PF, 0.5 mL 2021    Influenza, High-dose, Quadv, 65 yrs +, IM (Fluzone) 10/02/2020, 2021       Active Problems:  Patient Active Problem List   Diagnosis Code    HAP (hospital-acquired pneumonia) J18.9, Y95       Isolation/Infection:   Isolation            No Isolation          Patient Infection Status       Infection Onset Added Last Indicated Last Indicated By Review Planned Expiration Resolved Resolved By    None active    Resolved    COVID-19 (Rule Out) 22 Respiratory Panel, Molecular, with COVID-19 (Restricted: peds pts or suitable admitted adults) (Ordered)   22 Rule-Out Test Resulted    COVID-19 (Rule Out) 22 COVID-19 (Ordered)   22 Rule-Out Test Resulted            Nurse Assessment:  Last Vital Signs: /73   Pulse 93   Temp 97.9 °F (36.6 °C) (Oral)   Resp 16   Ht 5' 4\" (1.626 m)   Wt 110 lb 14.4 oz (50.3 kg)   SpO2 90%   Breastfeeding No   BMI 19.04 kg/m²     Last documented pain score (0-10 scale): Pain Level: 0  Last Weight:   Wt Readings from Last 1 Encounters:   22 110 lb 14.4 oz (50.3 kg)     Mental Status:  {IP PT MENTAL STATUS:93040}    IV Access:  [facility] IV ACCESS:070015608}    Nursing Mobility/ADLs:  Walking   {CHP DME SLKB:621667141}  Transfer  {CHP DME TIKY:965059292}  Bathing  {CHP DME AGRI:870365383}  Dressing  {CHP DME FCYI:521173098}  Toileting  {CHP DME LNKR:780976590}  Feeding  {CHP DME XBVV:101603855}  Med Admin  {P DME JXBL:688050702}  Med Delivery   {[facility] [place] [place] MED Delivery:452910966}    Wound Care Documentation and Therapy:        Elimination:  Continence: Bowel: {YES / DB:75076}  Bladder: {YES / XD:95505}  Urinary Catheter: {Urinary Catheter:026995876}   Colostomy/Ileostomy/Ileal Conduit: {YES / AC:74205}       Date of Last BM: ***    Intake/Output Summary (Last 24 hours) at 2022 1607  Last data filed at 2022 1103  Gross per 24 hour   Intake 10 ml   Output --   Net 10 ml     I/O last 3 completed shifts: In: 1910 [P.O.:1900;  I.V.:10]  Out: 500 [Urine:500]    Safety Concerns:     508 RIGID Safety Concerns:085896661}    Impairments/Disabilities:      508 RIGID Impairments/Disabilities:389264476}    Nutrition Therapy:  Current Nutrition Therapy:   508 RIGID Diet List:788952734}    Routes of Feeding: {P DME Other Feedings:288293012}  Liquids: {Slp liquid thickness:00592}  Daily Fluid Restriction: {CHP DME Yes amt example:159024899}  Last Modified Barium Swallow with Video (Video Swallowing Test): {Done Not Done MZVP:696685782}    Treatments at the Time of Hospital Discharge:   Respiratory Treatments: ***  Oxygen Therapy:  {Therapy; copd oxygen:43556}  Ventilator:    { CC Vent EVSW:363910922}    Rehab Therapies: {THERAPEUTIC INTERVENTION:9229965844}  Weight Bearing Status/Restrictions: 508 RACTIV Weight Bearin}  Other Medical Equipment (for information only, NOT a DME order):  {EQUIPMENT:983702155}  Other Treatments: ***    Patient's personal belongings (please select all that are sent with patient):  {CHP DME Belongings:194166981}    RN SIGNATURE:  {Esignature:394897903}    CASE MANAGEMENT/SOCIAL WORK SECTION    Inpatient Status Date: ***    Readmission Risk Assessment Score:  Readmission Risk              Risk of Unplanned Readmission:  16           Discharging to Facility/ Agency   Name:   Address:  Phone:  Fax:    Dialysis Facility (if applicable)   Name:  Address:  Dialysis Schedule:  Phone:  Fax:    / signature: {Esignature:662919795}    PHYSICIAN SECTION    Prognosis: {Prognosis:5111502217}    Condition at Discharge: Eugene Bellamy Patient Condition:717870524}    Rehab Potential (if transferring to Rehab): {Prognosis:0447633017}    Recommended Labs or Other Treatments After Discharge: ***    Physician Certification: I certify the above information and transfer of Torie Krueger  is necessary for the continuing treatment of the diagnosis listed and that she requires {Admit to Appropriate Level of Care:85049} for {GREATER/LESS:744659035} 30 days.      Update Admission H&P: {CHP DME Changes in [facility]:119780423}    PHYSICIAN SIGNATURE:  {Esignature:944361671}

## 2022-01-08 NOTE — PROGRESS NOTES
Pt ripped out her IV and insists that she is going to walk home. Case management is working on transport [facility] as soon as possible. Rapid covid test collected and sent, as required by [facility].

## 2022-01-08 NOTE — CARE COORDINATION
Placed call to admissions at [facility], they are sending information to Dr to review for return and will call back asap.

## 2022-01-09 VITALS
RESPIRATION RATE: 16 BRPM | HEIGHT: 64 IN | DIASTOLIC BLOOD PRESSURE: 72 MMHG | HEART RATE: 81 BPM | SYSTOLIC BLOOD PRESSURE: 125 MMHG | BODY MASS INDEX: 18.93 KG/M2 | WEIGHT: 110.9 LBS | OXYGEN SATURATION: 96 % | TEMPERATURE: 97.6 F

## 2022-01-09 LAB
BLOOD CULTURE, ROUTINE: NORMAL
CULTURE, BLOOD 2: NORMAL

## 2022-01-09 ASSESSMENT — PAIN SCALES - GENERAL: PAINLEVEL_OUTOF10: 0

## 2022-01-09 NOTE — PROGRESS NOTES
Patient left with all belongings via patient transport to [facility].

## 2022-01-11 NOTE — PROGRESS NOTES
Physician Progress Note      Zoila Pavon  [facility] #:                  [id number]  :                       1955  ADMIT DATE:       2022 8:52 PM  [address] [place] DATE:        2022 1:00 AM  RESPONDING  PROVIDER #:        Dex Soria MD          QUERY TEXT:    Patient admitted with Shortness of breath. Noted diagnostic findings of   emphysema. Please document in progress notes and discharge summary if you are   evaluating and/or treating any of the following: The medical record reflects the following:  Risk Factors: 75 yo w/ SOB, non productive cough, hx of COPD, bronchitis  Clinical Indicators: Per H&P: Acute COPD exacerbation: Likely 2/2 acute   tracheobronchitis. Per DC summary:  Acute COPD exacerbation: 2/2 pneumonia. Per CT : Moderate left basilar airspace consolidation consistent with   pneumonia. Severe emphysema. Treatment: CXR, chest CT, IV Levaquin, breathing treatments  Options provided:  -- Emphysema with acute exacerbation of chronic obstructive bronchitis and   pneumonia  -- Centrilobular Emphysema  -- Other - I will add my own diagnosis  -- Disagree - Not applicable / Not valid  -- Disagree - Clinically unable to determine / Unknown  -- Refer to Clinical Documentation Reviewer    PROVIDER RESPONSE TEXT:    This patient is being treated for Emphysema with acute exacerbation of chronic   obstructive bronchitis and pneumonia.     Query created by: Maxine Denton on 2022 7:04 AM      Electronically signed by:  Dex Soria MD 2022 8:06 AM

## 2022-01-22 ENCOUNTER — TELEPHONE (OUTPATIENT)
Dept: OTHER | Facility: CLINIC | Age: 67
End: 2022-01-22

## 2022-01-22 ENCOUNTER — APPOINTMENT (OUTPATIENT)
Dept: GENERAL RADIOLOGY | Age: 67
End: 2022-01-22
Payer: COMMERCIAL

## 2022-01-22 ENCOUNTER — HOSPITAL ENCOUNTER (EMERGENCY)
Age: 67
Discharge: NURSING, REHAB OR LONG-TERM CARE | End: 2022-01-22
Attending: STUDENT IN AN ORGANIZED HEALTH CARE EDUCATION/TRAINING PROGRAM
Payer: COMMERCIAL

## 2022-01-22 ENCOUNTER — APPOINTMENT (OUTPATIENT)
Dept: CT IMAGING | Age: 67
End: 2022-01-22
Payer: COMMERCIAL

## 2022-01-22 VITALS
OXYGEN SATURATION: 97 % | RESPIRATION RATE: 20 BRPM | BODY MASS INDEX: 17.93 KG/M2 | HEIGHT: 64 IN | DIASTOLIC BLOOD PRESSURE: 67 MMHG | HEART RATE: 89 BPM | TEMPERATURE: 98.4 F | WEIGHT: 105 LBS | SYSTOLIC BLOOD PRESSURE: 101 MMHG

## 2022-01-22 DIAGNOSIS — U07.1 COVID-19: ICD-10-CM

## 2022-01-22 DIAGNOSIS — J18.9 PNEUMONIA DUE TO INFECTIOUS ORGANISM, UNSPECIFIED LATERALITY, UNSPECIFIED PART OF LUNG: ICD-10-CM

## 2022-01-22 DIAGNOSIS — R41.0 CONFUSION: Primary | ICD-10-CM

## 2022-01-22 LAB
ANION GAP SERPL CALCULATED.3IONS-SCNC: 9 MMOL/L (ref 3–16)
BASE EXCESS VENOUS: 8 MMOL/L (ref -2–3)
BASOPHILS ABSOLUTE: 0 K/UL (ref 0–0.2)
BASOPHILS RELATIVE PERCENT: 0.3 %
BILIRUBIN URINE: NEGATIVE
BLOOD, URINE: NEGATIVE
BUN BLDV-MCNC: 15 MG/DL (ref 7–20)
CALCIUM SERPL-MCNC: 8.6 MG/DL (ref 8.3–10.6)
CARBOXYHEMOGLOBIN: 1.1 % (ref 0–1.5)
CHLORIDE BLD-SCNC: 103 MMOL/L (ref 99–110)
CLARITY: CLEAR
CO2: 34 MMOL/L (ref 21–32)
COLOR: YELLOW
CREAT SERPL-MCNC: <0.5 MG/DL (ref 0.6–1.2)
EOSINOPHILS ABSOLUTE: 0.1 K/UL (ref 0–0.6)
EOSINOPHILS RELATIVE PERCENT: 1.5 %
GFR AFRICAN AMERICAN: >60
GFR NON-AFRICAN AMERICAN: >60
GLUCOSE BLD-MCNC: 101 MG/DL (ref 70–99)
GLUCOSE URINE: NEGATIVE MG/DL
HCO3 VENOUS: 36 MMOL/L (ref 24–28)
HCT VFR BLD CALC: 38.1 % (ref 36–48)
HEMOGLOBIN, VEN, REDUCED: 25.5 %
HEMOGLOBIN: 12.4 G/DL (ref 12–16)
KETONES, URINE: ABNORMAL MG/DL
LEUKOCYTE ESTERASE, URINE: NEGATIVE
LYMPHOCYTES ABSOLUTE: 0.9 K/UL (ref 1–5.1)
LYMPHOCYTES RELATIVE PERCENT: 22 %
MCH RBC QN AUTO: 32.9 PG (ref 26–34)
MCHC RBC AUTO-ENTMCNC: 32.5 G/DL (ref 31–36)
MCV RBC AUTO: 101.2 FL (ref 80–100)
METHEMOGLOBIN VENOUS: 0.5 % (ref 0–1.5)
MICROSCOPIC EXAMINATION: ABNORMAL
MONOCYTES ABSOLUTE: 0.7 K/UL (ref 0–1.3)
MONOCYTES RELATIVE PERCENT: 17 %
NEUTROPHILS ABSOLUTE: 2.5 K/UL (ref 1.7–7.7)
NEUTROPHILS RELATIVE PERCENT: 59.2 %
NITRITE, URINE: NEGATIVE
O2 SAT, VEN: 74 %
PCO2, VEN: 66.5 MMHG (ref 41–51)
PDW BLD-RTO: 13.6 % (ref 12.4–15.4)
PH UA: 6.5 (ref 5–8)
PH VENOUS: 7.34 (ref 7.35–7.45)
PLATELET # BLD: 119 K/UL (ref 135–450)
PMV BLD AUTO: 9.7 FL (ref 5–10.5)
PO2, VEN: 43.3 MMHG (ref 25–40)
POTASSIUM REFLEX MAGNESIUM: 3.8 MMOL/L (ref 3.5–5.1)
PRO-BNP: 517 PG/ML (ref 0–124)
PROTEIN UA: NEGATIVE MG/DL
RBC # BLD: 3.76 M/UL (ref 4–5.2)
SODIUM BLD-SCNC: 146 MMOL/L (ref 136–145)
SPECIFIC GRAVITY UA: 1.02 (ref 1–1.03)
TCO2 CALC VENOUS: 38 MMOL/L
TROPONIN: <0.01 NG/ML
URINE REFLEX TO CULTURE: ABNORMAL
URINE TYPE: ABNORMAL
UROBILINOGEN, URINE: 0.2 E.U./DL
WBC # BLD: 4.3 K/UL (ref 4–11)

## 2022-01-22 PROCEDURE — 93005 ELECTROCARDIOGRAM TRACING: CPT

## 2022-01-22 PROCEDURE — 99285 EMERGENCY DEPT VISIT HI MDM: CPT

## 2022-01-22 PROCEDURE — 85025 COMPLETE CBC W/AUTO DIFF WBC: CPT

## 2022-01-22 PROCEDURE — 70450 CT HEAD/BRAIN W/O DYE: CPT

## 2022-01-22 PROCEDURE — 80048 BASIC METABOLIC PNL TOTAL CA: CPT

## 2022-01-22 PROCEDURE — 71045 X-RAY EXAM CHEST 1 VIEW: CPT

## 2022-01-22 PROCEDURE — 82803 BLOOD GASES ANY COMBINATION: CPT

## 2022-01-22 PROCEDURE — 84484 ASSAY OF TROPONIN QUANT: CPT

## 2022-01-22 PROCEDURE — 81003 URINALYSIS AUTO W/O SCOPE: CPT

## 2022-01-22 PROCEDURE — 83880 ASSAY OF NATRIURETIC PEPTIDE: CPT

## 2022-01-22 NOTE — ED PROVIDER NOTES
[address] ENCOUNTER          ATTENDING PHYSICIAN NOTE       Date of evaluation: 1/22/2022    Chief Complaint     Altered Mental Status and Positive For Covid-19      History of Present Illness     Dax JeanB-aptiste is a 69 y.o. female who presents with a chief complaint of low pulse ox. Patient currently resident at [facility] for vascular dementia. Patient currently states that she feels fine however is somewhat confused. EMS states the patient was found to have a pulse ox in the 70s to 80s. She is intermittently on 2 L of oxygen and hyper COPD but not at all times. Was recently diagnosed with COVID-19. Has any chest pain. Denies any fever. She is unsure makes her symptoms better or worse. Unable to provide further history secondary to mental status but she is able to answer yes/no questions and provide ROS    Discussed with [facility], states patient was intermittently more aggressive and altered than in the past.  She does have a history of vascular dementia however they wanted patient to be looked at for her intermittent aggressiveness as well as her hypoxemia. Patient was diagnosed COVID-19 recently, has been placed on antibiotics for possible bacterial superinfection by  at the facility. Review of Systems     REVIEW OF SYSTEMS  GENERAL: Negative for any fevers, chills, or weight loss. HEENT: Negative for any head trauma, neck trauma, neck stiffness, photophobia, phonophobia, sinusitis, rhinitis. CARDIAC: Negative for any chest pain, palpitations  PULMONARY: Negative for any shortness of breath, cough, wheezing  GASTROINTESTINAL: Negative for any abdominal pain, nausea, vomiting,   GENITOURINARY: Negative for any dysuria, hematuria, incontinence. SKIN: Negative for any rashes, wounds  MSK: Negative for any joint pains, back pain  HEMATOLOGIC: Negative for any abnormal bruising, frequent infections or bleeding.   NEUROLOGIC: Negative for any headache, dizziness, focal weakness  PSYCH: Per HPI    Past Medical, Surgical, Family, and Social History     She has a past medical history of COPD (chronic obstructive pulmonary disease) ([address] She has no past surgical history on file. Her family history is not on file. She reports that she has been smoking. She has never used smokeless tobacco. She reports previous alcohol use. She reports previous drug use. Medications     Previous Medications    ACETAMINOPHEN (TYLENOL) 325 MG TABLET    Take 650 mg by mouth every 6 hours as needed (headache)     ALBUTEROL SULFATE HFA (VENTOLIN HFA) 108 (90 BASE) MCG/ACT INHALER    Inhale 2 puffs into the lungs every 6 hours as needed for Wheezing    ALUMINUM & MAGNESIUM HYDROXIDE-SIMETHICONE (MAALOX) 200-200-20 MG/5ML SUSP SUSPENSION    Take 30 mLs by mouth every 4 hours as needed for Indigestion    ARIPIPRAZOLE (ABILIFY) 5 MG TABLET    Take 1 tablet by mouth daily    ATORVASTATIN (LIPITOR) 20 MG TABLET    Take 20 mg by mouth nightly    BUSPIRONE (BUSPAR) 5 MG TABLET    Take 1 tablet by mouth 2 times daily    DIVALPROEX (DEPAKOTE) 250 MG DR TABLET    Take 250 mg by mouth 2 times daily ?  DR    DONEPEZIL (ARICEPT) 5 MG TABLET    Take 5 mg by mouth nightly    IPRATROPIUM-ALBUTEROL (DUONEB) 0.5-2.5 (3) MG/3ML SOLN NEBULIZER SOLUTION    Inhale 1 vial into the lungs every 6 hours as needed for Shortness of Breath    LEVETIRACETAM (KEPPRA) 500 MG TABLET    Take 1,500 mg by mouth 2 times daily    MAGNESIUM HYDROXIDE (MILK OF MAGNESIA) 400 MG/5ML SUSPENSION    Take 30 mLs by mouth daily as needed for Constipation    MONTELUKAST (SINGULAIR) 10 MG TABLET    Take 10 mg by mouth nightly    NICOTINE (NICODERM CQ) 21 MG/24HR    Place 1 patch onto the skin every 24 hours    OMEPRAZOLE (PRILOSEC) 40 MG DELAYED RELEASE CAPSULE    Take 40 mg by mouth daily    SERTRALINE (ZOLOFT) 25 MG TABLET    Take 1 tablet by mouth daily    SUCRALFATE (CARAFATE) 1 GM TABLET    Take 1 g by mouth 4 times daily       Allergies She is allergic to pcn [penicillins] and sulfa antibiotics. Physical Exam     INITIAL VITALS: BP: 118/67, Temp: 98.4 °F (36.9 °C), Pulse: 99, Resp: 16, SpO2: 95 %   Physical Exam  Vitals and nursing note reviewed. Constitutional:       Appearance: Normal appearance. HENT:      Head: Normocephalic and atraumatic. Eyes:      Conjunctiva/sclera: Conjunctivae normal.      Pupils: Pupils are equal, round, and reactive to light. Cardiovascular:      Rate and Rhythm: Normal rate and regular rhythm. Pulses: Normal pulses. Heart sounds: Normal heart sounds. Pulmonary:      Effort: Pulmonary effort is normal.      Breath sounds: Normal breath sounds. Abdominal:      General: Abdomen is flat. Palpations: Abdomen is soft. Tenderness: There is no abdominal tenderness. Musculoskeletal:         General: No swelling or tenderness. Normal range of motion. Cervical back: Normal range of motion and neck supple. Skin:     General: Skin is warm and dry. Comments: Ecchymosis to bilateral lower extremities, left worse than right. Patient states this is chronic   Neurological:      General: No focal deficit present. Mental Status: She is alert. Cranial Nerves: No cranial nerve deficit. Sensory: No sensory deficit. Comments: Oriented to person, place, somewhat confused as to time and situation. Psychiatric:         Mood and Affect: Mood normal.         Thought Content: Thought content normal.         Diagnostic Results     EKG   Sinus rhythm with short NV 89, significant motion artifact, nonspecific ST changes, normal axis    RADIOLOGY:  CT HEAD WO CONTRAST   Final Result      1. No acute intracranial process. 2.  Mild cerebral atrophy and mild chronic small vessel ischemic disease. XR CHEST PORTABLE   Final Result   1. Patchy bilateral lower lung airspace opacity suspicious for early bilateral pneumonia.           LABS:   Results for orders placed or performed during the hospital encounter of 01/22/22   CBC Auto Differential   Result Value Ref Range    WBC 4.3 4.0 - 11.0 K/uL    RBC 3.76 (L) 4.00 - 5.20 M/uL    Hemoglobin 12.4 12.0 - 16.0 g/dL    Hematocrit 38.1 36.0 - 48.0 %    .2 (H) 80.0 - 100.0 fL    MCH 32.9 26.0 - 34.0 pg    MCHC 32.5 31.0 - 36.0 g/dL    RDW 13.6 12.4 - 15.4 %    Platelets 461 (L) 492 - 450 K/uL    MPV 9.7 5.0 - 10.5 fL    Neutrophils % 59.2 %    Lymphocytes % 22.0 %    Monocytes % 17.0 %    Eosinophils % 1.5 %    Basophils % 0.3 %    Neutrophils Absolute 2.5 1.7 - 7.7 K/uL    Lymphocytes Absolute 0.9 (L) 1.0 - 5.1 K/uL    Monocytes Absolute 0.7 0.0 - 1.3 K/uL    Eosinophils Absolute 0.1 0.0 - 0.6 K/uL    Basophils Absolute 0.0 0.0 - 0.2 K/uL   Basic Metabolic Panel w/ Reflex to MG   Result Value Ref Range    Sodium 146 (H) 136 - 145 mmol/L    Potassium reflex Magnesium 3.8 3.5 - 5.1 mmol/L    Chloride 103 99 - 110 mmol/L    CO2 34 (H) 21 - 32 mmol/L    Anion Gap 9 3 - 16    Glucose 101 (H) 70 - 99 mg/dL    BUN 15 7 - 20 mg/dL    CREATININE <0.5 (L) 0.6 - 1.2 mg/dL    GFR Non-African American >60 >60    GFR African American >60 >60    Calcium 8.6 8.3 - 10.6 mg/dL   Troponin   Result Value Ref Range    Troponin <0.01 <0.01 ng/mL   Brain Natriuretic Peptide   Result Value Ref Range    Pro- (H) 0 - 124 pg/mL   Blood Gas, Venous   Result Value Ref Range    pH, Ezekiel 7.342 (L) 7.350 - 7.450    pCO2, Ezekiel 66.5 (H) 41.0 - 51.0 mmHg    pO2, Ezekiel 43.3 (H) 25.0 - 40.0 mmHg    HCO3, Venous 36.0 (H) 24.0 - 28.0 mmol/L    Base Excess, Ezekiel 8.0 (H) -2.0 - 3.0 mmol/L    O2 Sat, Ezekiel 74 Not established %    Carboxyhemoglobin 1.1 0.0 - 1.5 %    MetHgb, Ezekiel 0.5 0.0 - 1.5 %    TC02 (Calc), Ezekiel 38 mmol/L    Hemoglobin, Ezekiel, Reduced 25.50 %   Urinalysis Reflex to Culture    Specimen: Urine, straight catheter   Result Value Ref Range    Color, UA Yellow Straw/Yellow    Clarity, UA Clear Clear    Glucose, Ur Negative Negative mg/dL    Bilirubin Urine Negative Negative    Ketones, Urine TRACE (A) Negative mg/dL    Specific Gravity, UA 1.020 1.005 - 1.030    Blood, Urine Negative Negative    pH, UA 6.5 5.0 - 8.0    Protein, UA Negative Negative mg/dL    Urobilinogen, Urine 0.2 <2.0 E.U./dL    Nitrite, Urine Negative Negative    Leukocyte Esterase, Urine Negative Negative    Microscopic Examination Not Indicated     Urine Type Voided     Urine Reflex to Culture Not Indicated        ED BEDSIDE ULTRASOUND:      RECENT VITALS:  BP: 118/67,Temp: 98.4 °F (36.9 °C), Pulse: 99, Resp: 16, SpO2: 95 %     Procedures         ED Course     Nursing Notes, Past Medical Hx, Past Surgical Hx, Social Hx,Allergies, and Family Hx were reviewed. patient was given the following medications:  No orders of the defined types were placed in this encounter. CONSULTS:  None    MEDICAL DECISIONMAKING / ASSESSMENT / PLAN     Lincoln Santos is a 69 y.o. female *from her nursing facility for pneumonia, aggression and confusion. Patient with a history of confusion, is in a psych capable facility however nursing facility stated that they were concerned that she was more aggressive. Laboratory studies and imaging here demonstrating continued pneumonia, patient does have oxygen at home, after continued oxygen therapy and verbal de-escalation patient is calm, tolerating p.o. Discussed with facility who will accept patient back in transfer, many continue increasing her oxygen as needed, however returning to the ED should oxygen requirement significantly increased. Also recommend continue antibiotics. Discussed with her facility, all questions answered appropriately    Clinical Impression     1. Confusion    2. Pneumonia due to infectious organism, unspecified laterality, unspecified part of lung    3.  COVID-19        Disposition     PATIENT REFERRED TO:  Patient's PCP            DISCHARGE MEDICATIONS:  New Prescriptions    No medications on file       DISPOSITION    Discharged back to facility Chavez Hernandez MD  01/22/22 0591

## 2022-01-22 NOTE — ED NOTES
Urine obtained by straight cath. Pt tolerated procedure well.      Jose Ramon Mercado RN  01/22/22 [id number]

## 2022-01-22 NOTE — ED NOTES
Bed: B25-25  Expected date:   Expected time:   Means of arrival:   Comments:  ALVA Javier RN  01/22/22 0290

## 2022-01-22 NOTE — ED TRIAGE NOTES
Pt came to ED from [facility] for hypoxia. Pt is COVID+. Pt had oxygen saturation of 76 on room air. Pt is on 5L nasal cannula and saturating in the mid 90's. Per medics pt was refusing medication at facility.

## 2022-01-23 NOTE — ED NOTES
Attempted to call report to [facility] without success. No one answered the phone.      Symone Munoz RN  01/22/22 2041

## 2022-01-23 NOTE — ED NOTES
Patient prepared for and ready to be discharged. Patient discharged at this time in no acute distress after verbalizing understanding of discharge instructions. Patient left after receiving After Visit Summary instructions.       Keith Brewer RN  01/22/22 0386

## 2022-01-24 ENCOUNTER — TELEPHONE (OUTPATIENT)
Dept: OTHER | Facility: CLINIC | Age: 67
End: 2022-01-24

## 2022-01-24 NOTE — TELEPHONE ENCOUNTER
Nurse Access contacted [facility] to notify nurse that pt was seen in ED and will require follow up. Transferred multiple times to different units and still did not speak with nurse. Will follow up.

## 2022-01-28 LAB
EKG ATRIAL RATE: 89 BPM
EKG DIAGNOSIS: NORMAL
EKG P AXIS: 42 DEGREES
EKG P-R INTERVAL: 100 MS
EKG Q-T INTERVAL: 380 MS
EKG QRS DURATION: 74 MS
EKG QTC CALCULATION (BAZETT): 462 MS
EKG R AXIS: 94 DEGREES
EKG T AXIS: 36 DEGREES
EKG VENTRICULAR RATE: 89 BPM